# Patient Record
Sex: MALE | Race: WHITE | URBAN - METROPOLITAN AREA
[De-identification: names, ages, dates, MRNs, and addresses within clinical notes are randomized per-mention and may not be internally consistent; named-entity substitution may affect disease eponyms.]

---

## 2018-06-20 ENCOUNTER — INPATIENT (INPATIENT)
Facility: HOSPITAL | Age: 78
LOS: 1 days | Discharge: NURSING, REHAB OR LONG-TERM CARE | DRG: 640 | End: 2018-06-22
Attending: STUDENT IN AN ORGANIZED HEALTH CARE EDUCATION/TRAINING PROGRAM | Admitting: STUDENT IN AN ORGANIZED HEALTH CARE EDUCATION/TRAINING PROGRAM
Payer: MEDICARE

## 2018-06-20 VITALS
TEMPERATURE: 98 F | OXYGEN SATURATION: 95 % | RESPIRATION RATE: 18 BRPM | DIASTOLIC BLOOD PRESSURE: 79 MMHG | WEIGHT: 158.07 LBS | HEART RATE: 88 BPM | SYSTOLIC BLOOD PRESSURE: 148 MMHG

## 2018-06-20 DIAGNOSIS — R41.0 DISORIENTATION, UNSPECIFIED: ICD-10-CM

## 2018-06-20 LAB
ALBUMIN SERPL ELPH-MCNC: 3.9 G/DL — SIGNIFICANT CHANGE UP (ref 3.3–5)
ALP SERPL-CCNC: 56 U/L — SIGNIFICANT CHANGE UP (ref 40–120)
ALT FLD-CCNC: 25 U/L — SIGNIFICANT CHANGE UP (ref 10–45)
ANION GAP SERPL CALC-SCNC: 21 MMOL/L — HIGH (ref 5–17)
ANION GAP SERPL CALC-SCNC: 29 MMOL/L — HIGH (ref 5–17)
APAP SERPL-MCNC: <15 UG/ML — SIGNIFICANT CHANGE UP (ref 10–30)
APPEARANCE UR: ABNORMAL
AST SERPL-CCNC: 19 U/L — SIGNIFICANT CHANGE UP (ref 10–40)
B-OH-BUTYR SERPL-SCNC: 2.4 MMOL/L — HIGH
B-OH-BUTYR SERPL-SCNC: 3 MMOL/L — HIGH
BASOPHILS NFR BLD AUTO: 0.5 % — SIGNIFICANT CHANGE UP (ref 0–2)
BILIRUB SERPL-MCNC: 0.4 MG/DL — SIGNIFICANT CHANGE UP (ref 0.2–1.2)
BILIRUB UR-MCNC: ABNORMAL
BUN SERPL-MCNC: 39 MG/DL — HIGH (ref 7–23)
BUN SERPL-MCNC: 39 MG/DL — HIGH (ref 7–23)
CALCIUM SERPL-MCNC: 8.4 MG/DL — SIGNIFICANT CHANGE UP (ref 8.4–10.5)
CALCIUM SERPL-MCNC: 9.9 MG/DL — SIGNIFICANT CHANGE UP (ref 8.4–10.5)
CHLORIDE SERPL-SCNC: 90 MMOL/L — LOW (ref 96–108)
CHLORIDE SERPL-SCNC: 98 MMOL/L — SIGNIFICANT CHANGE UP (ref 96–108)
CO2 SERPL-SCNC: 13 MMOL/L — LOW (ref 22–31)
CO2 SERPL-SCNC: 15 MMOL/L — LOW (ref 22–31)
COLOR SPEC: YELLOW — SIGNIFICANT CHANGE UP
CREAT SERPL-MCNC: 2.34 MG/DL — HIGH (ref 0.5–1.3)
CREAT SERPL-MCNC: 2.65 MG/DL — HIGH (ref 0.5–1.3)
DIFF PNL FLD: ABNORMAL
EOSINOPHIL NFR BLD AUTO: 2.2 % — SIGNIFICANT CHANGE UP (ref 0–6)
ETHANOL SERPL-MCNC: <10 MG/DL — SIGNIFICANT CHANGE UP (ref 0–10)
EXTRA BLUE TOP TUBE: SIGNIFICANT CHANGE UP
EXTRA SST TUBE: SIGNIFICANT CHANGE UP
GAS PNL BLDV: SIGNIFICANT CHANGE UP
GLUCOSE SERPL-MCNC: 149 MG/DL — HIGH (ref 70–99)
GLUCOSE SERPL-MCNC: 149 MG/DL — HIGH (ref 70–99)
GLUCOSE UR QL: NEGATIVE — SIGNIFICANT CHANGE UP
HCT VFR BLD CALC: 36 % — LOW (ref 39–50)
HGB BLD-MCNC: 12.1 G/DL — LOW (ref 13–17)
KETONES UR-MCNC: ABNORMAL MG/DL
LACTATE SERPL-SCNC: 1.2 MMOL/L — SIGNIFICANT CHANGE UP (ref 0.5–2)
LACTATE SERPL-SCNC: 2.3 MMOL/L — HIGH (ref 0.5–2)
LEUKOCYTE ESTERASE UR-ACNC: NEGATIVE — SIGNIFICANT CHANGE UP
LYMPHOCYTES # BLD AUTO: 14.8 % — SIGNIFICANT CHANGE UP (ref 13–44)
MCHC RBC-ENTMCNC: 31.4 PG — SIGNIFICANT CHANGE UP (ref 27–34)
MCHC RBC-ENTMCNC: 33.6 G/DL — SIGNIFICANT CHANGE UP (ref 32–36)
MCV RBC AUTO: 93.5 FL — SIGNIFICANT CHANGE UP (ref 80–100)
MONOCYTES NFR BLD AUTO: 8.2 % — SIGNIFICANT CHANGE UP (ref 2–14)
NEUTROPHILS NFR BLD AUTO: 74.3 % — SIGNIFICANT CHANGE UP (ref 43–77)
NITRITE UR-MCNC: NEGATIVE — SIGNIFICANT CHANGE UP
PCP SPEC-MCNC: SIGNIFICANT CHANGE UP
PH UR: 5 — SIGNIFICANT CHANGE UP (ref 5–8)
PLATELET # BLD AUTO: 435 K/UL — HIGH (ref 150–400)
POTASSIUM SERPL-MCNC: 4.2 MMOL/L — SIGNIFICANT CHANGE UP (ref 3.5–5.3)
POTASSIUM SERPL-MCNC: 4.3 MMOL/L — SIGNIFICANT CHANGE UP (ref 3.5–5.3)
POTASSIUM SERPL-SCNC: 4.2 MMOL/L — SIGNIFICANT CHANGE UP (ref 3.5–5.3)
POTASSIUM SERPL-SCNC: 4.3 MMOL/L — SIGNIFICANT CHANGE UP (ref 3.5–5.3)
PROT SERPL-MCNC: 8.2 G/DL — SIGNIFICANT CHANGE UP (ref 6–8.3)
PROT UR-MCNC: 30 MG/DL
RAPID RVP RESULT: SIGNIFICANT CHANGE UP
RBC # BLD: 3.85 M/UL — LOW (ref 4.2–5.8)
RBC # FLD: 13.3 % — SIGNIFICANT CHANGE UP (ref 10.3–16.9)
SALICYLATES SERPL-MCNC: <0.3 MG/DL — LOW (ref 2.8–20)
SODIUM SERPL-SCNC: 132 MMOL/L — LOW (ref 135–145)
SODIUM SERPL-SCNC: 134 MMOL/L — LOW (ref 135–145)
SP GR SPEC: >=1.03 — SIGNIFICANT CHANGE UP (ref 1–1.03)
TSH SERPL-MCNC: 3.25 UIU/ML — SIGNIFICANT CHANGE UP (ref 0.35–4.94)
UROBILINOGEN FLD QL: 1 E.U./DL — SIGNIFICANT CHANGE UP
WBC # BLD: 14.3 K/UL — HIGH (ref 3.8–10.5)
WBC # FLD AUTO: 14.3 K/UL — HIGH (ref 3.8–10.5)

## 2018-06-20 PROCEDURE — 93010 ELECTROCARDIOGRAM REPORT: CPT

## 2018-06-20 PROCEDURE — 70450 CT HEAD/BRAIN W/O DYE: CPT | Mod: 26

## 2018-06-20 PROCEDURE — 99223 1ST HOSP IP/OBS HIGH 75: CPT | Mod: GC

## 2018-06-20 PROCEDURE — 71046 X-RAY EXAM CHEST 2 VIEWS: CPT | Mod: 26

## 2018-06-20 PROCEDURE — 99285 EMERGENCY DEPT VISIT HI MDM: CPT | Mod: 25

## 2018-06-20 RX ORDER — SODIUM CHLORIDE 9 MG/ML
1000 INJECTION INTRAMUSCULAR; INTRAVENOUS; SUBCUTANEOUS ONCE
Qty: 0 | Refills: 0 | Status: COMPLETED | OUTPATIENT
Start: 2018-06-20 | End: 2018-06-20

## 2018-06-20 RX ORDER — SODIUM CHLORIDE 9 MG/ML
1000 INJECTION, SOLUTION INTRAVENOUS
Qty: 0 | Refills: 0 | Status: DISCONTINUED | OUTPATIENT
Start: 2018-06-20 | End: 2018-06-22

## 2018-06-20 RX ADMIN — SODIUM CHLORIDE 1000 MILLILITER(S): 9 INJECTION INTRAMUSCULAR; INTRAVENOUS; SUBCUTANEOUS at 22:12

## 2018-06-20 RX ADMIN — SODIUM CHLORIDE 1000 MILLILITER(S): 9 INJECTION INTRAMUSCULAR; INTRAVENOUS; SUBCUTANEOUS at 20:59

## 2018-06-20 RX ADMIN — SODIUM CHLORIDE 100 MILLILITER(S): 9 INJECTION, SOLUTION INTRAVENOUS at 22:15

## 2018-06-20 NOTE — ED ADULT TRIAGE NOTE - CHIEF COMPLAINT QUOTE
" I have been off my psych meds for two weeks and MD Galvez wants me to come here to get clearance".

## 2018-06-20 NOTE — ED BEHAVIORAL HEALTH ASSESSMENT NOTE - OTHER
older than stated age not assessed edentulous "fine" though illogical when speaks about discontinuing all psychiatric medications

## 2018-06-20 NOTE — ED BEHAVIORAL HEALTH ASSESSMENT NOTE - SUMMARY
77 yo male with self-reported bipolar with apparently no psychiatric hospitalizations in past and multiple medical problems brought in by self accompanied by friend Lanette at behest of PCP who saw patient earlier. Patient has discontinued all medications, including thyroid, blood pressure, and cannot articulate why. It is unclear if patient is grappling with an organic process, such as new-onset dementia vs. delirium perhaps resolving (elevated WBC ct and patient reported bronchitis) that is making patient amotivated and at times behaviorally disinhibited regarding sex,and not able to complete ADLs. Writer is more inclined to think process is more organic in nature. 77 yo male with self-reported bipolar with apparently no psychiatric hospitalizations in past and multiple medical problems brought in by self accompanied by friend Lanette at behest of PCP who saw patient earlier today. Patient has discontinued all medications, including thyroid, blood pressure, and cannot articulate why. It is unclear if patient is grappling with an organic process, such as new-onset dementia vs. delirium perhaps resolving (elevated WBC ct and patient reported bronchitis) that is making patient amotivated and at times behaviorally disinhibited regarding sex and not able to complete ADLs. Writer is more inclined to think process is more organic in nature. Patient does not appear to be an acute risk to himself or others. After discussion with ED physician Dr. Montelongo, patient was going to be admitted to medicine. At this time, patient does not require CO. Consult Liaison psychiatry will follow up in AM. Disposition planning might include APS referral in the ensuing days.

## 2018-06-20 NOTE — ED ADULT NURSE NOTE - CHPI ED SYMPTOMS NEG
no disorientation/no homicidal/no agitation/no weakness/no paranoia/no confusion/no change in level of consciousness/no suicidal/no hallucinations

## 2018-06-20 NOTE — ED PROVIDER NOTE - PHYSICAL EXAMINATION
Constitutional: Well appearing, well nourished, awake, alert, oriented to person, place, time/situation and in no apparent distress.  ENMT: Airway patent. Dry MM. edentulous.  Eyes: Clear bilaterally  Cardiac: Normal rate, regular rhythm.  Heart sounds S1, S2.  No murmurs, rubs or gallops.  Respiratory: Breaths sounds equal and clear b/l. No increased WOB, tachypnea, hypoxia, or accessory mm use. Pt speaks in full sentences.   Gastrointestinal: Abd soft, NT, ND, NABS. No guarding, rebound, or rigidity. No pulsatile abdominal masses. No organomegaly appreciated. No CVAT   Musculoskeletal: Range of motion is not limited  Neuro: Alert and oriented, grossly non focal  Skin: Skin normal color for race, warm, dry and intact. No evidence of rash.  Psych: Alert and oriented to person, place, time/situation. normal mood and affect. no apparent risk to self or others.

## 2018-06-20 NOTE — ED PROVIDER NOTE - PROGRESS NOTE DETAILS
Psych consulted and will see the pt Psych at bedside evaluating pt D/w PCP Dr Forbes - pt w/ long-standing hx depression. PT came to office stating he is not taking meds nor eating for past 2 weeks. He feels 2/2 severe depression. Pt would not state he would take his meds. He sent pt in for psych eval. Pt w/ AG acidosis, likely 2/2 dehydration and not eating (starvation ketosis). Not diabetic, . Continue IVF, D51/2NS, repeat labs, admit to medicine. Pt seen by psychiatry - they will follow. No psych meds at this time. No 1:1 necessary at this time a/p psychiatry Dr [place] closing, neg lactate, improving labs. Will admit to medicine for continued treatment

## 2018-06-20 NOTE — ED PROVIDER NOTE - CARE PLAN
Principal Discharge DX:	Dehydration  Secondary Diagnosis:	Starvation ketoacidosis  Secondary Diagnosis:	Depression, unspecified depression type

## 2018-06-20 NOTE — ED PROVIDER NOTE - MEDICAL DECISION MAKING DETAILS
Pt p/w depression w/o SI / HI / AH / VH. Pt not taking meds or eating x 2 weeks. Will r/o medical causes / psych medical clearance, dehydration, electrolyte abnormalities. 1:1. Psych consult. Dispo pending w/u and psych recommendations.

## 2018-06-20 NOTE — ED BEHAVIORAL HEALTH ASSESSMENT NOTE - RISK ASSESSMENT
Patient cannot safely care for himself at present Patient cannot safely care for himself at present. Thus, he requires admission to rule out organicity given patient's multiple derangements. Psychiatry will continue to follow.

## 2018-06-20 NOTE — ED BEHAVIORAL HEALTH ASSESSMENT NOTE - DESCRIPTION
Patient is pleasant and behaving appropriately. See above. Patient has stopped all medications. lives alone in apt in [place]

## 2018-06-20 NOTE — ED ADULT NURSE NOTE - OBJECTIVE STATEMENT
Pt w hx of chronic depression referred to ER by his PsychMD Leonor. Pt states he has not been taking his psych meds for two weeks, has not been eating properly and was feeling "washed out". He denies SI/HI, drug or alcohol use. Pt placed on cont observation for safety.

## 2018-06-20 NOTE — ED BEHAVIORAL HEALTH ASSESSMENT NOTE - HPI (INCLUDE ILLNESS QUALITY, SEVERITY, DURATION, TIMING, CONTEXT, MODIFYING FACTORS, ASSOCIATED SIGNS AND SYMPTOMS)
77 yo single, domiciled alone, retired male, non-caregiver, no illicit substances with self-reported hx of bipolar (denies ever being psychiatrically hospitalized, patient's PCP denies knowing in detail patient's psychiatric history) has not seen outpatient psychiatrist Dr. Lissette Arroyo [phone] in over 1 month and multiple medical problems, including hypothyroidism, HTN, and COPD with recent self-reported bronchitis (elevated WBC ct 14.3) BIBS accompanied by friend Lanette [phone] at behest of patient's PCP. PCP Dr. Rodríguez was concerned because patient had stopped his medications (Patient relayed that he self d/c'd all medications.) two weeks ago and was not eating well. Additionally, Patient told writer that he discontinued all medications, including psychiatric, two weeks ago without telling anyone. He conveyed that he stopped them for a "washout," Patient could not elaborate what that meant. He told writer that he had his divalproex sodium discontinued approximately 6 months ago and that concomitantly he would be tapered off fluoxetine and started on oxcarbazepine. Patient did not know divalproex sodium and fluoxetine doses, though said they were stopped more than 2 weeks ago, but recalled that oxcarbazepine dose was 300mg BID. Patient was AAO*3 (though somewhat confused when conveying psychiatric and medical histories). He described his mood as "fine," but admitted that he has not been taking good care of himself. Friend Lanette brought him to PCP today. Of note, patient mentioned feeling more sexual lately. Patient denies ever having manic episode, ever being suicidal, homicidal. He denies anxiety, auditory and visual hallucinations. No delusions have been verbalized. Patient thinks that he will be admitted to the hospital.    As stated above, patient has WBC ct of 14.3. Additionally, other metabolic derangements include Na 132, Cr. 2.65, Anion gap 29.

## 2018-06-20 NOTE — ED CLERICAL - NS ED CLERK NOTE PRE-ARRIVAL INFORMATION; ADDITIONAL PRE-ARRIVAL INFORMATION
79 y/o Patrick Allen stopped taking all meds, HTN, severe  depression , called in by Dr Forbes [phone] see intake paper

## 2018-06-20 NOTE — ED PROVIDER NOTE - OBJECTIVE STATEMENT
Pt w/ PMHx COPD, HTN, HLD, thyroid disease, depression sent in by PCP for evaluation of depression. Pt reports 5-6 years depression. Pt was previously on Prozac and Depakote, however his psychiatrist has been decreasing the Prozac, and starting the pt on Trileptal as of 1 month ago. Pt reports he has been having inc sexual activity, and also reports recent "bout of bronchitis." Pt states since then he has been "washed out." Pt stopped taking his meds and stopped eating for the past 2 weeks. Pt admits to depression, but denies SI, HI, AH / VH. Pt reports prior SI. Pt lives alone. Pt denies drug / alcohol abuse. No current physical complaints. + mild cough, dry. No f/c. No CP, SOB. Pt w/ PMHx COPD, HTN, HLD, thyroid disease, pernicious anemia, depression sent in by PCP for evaluation of depression. Pt reports 5-6 years depression. Pt was previously on Prozac and Depakote, however his psychiatrist has been decreasing the Prozac, and starting the pt on Trileptal as of 1 month ago. Pt reports he has been having inc sexual activity, and also reports recent "bout of bronchitis." Pt states since then he has been "washed out." Pt stopped taking his meds and stopped eating for the past 2 weeks. Pt admits to depression, but denies SI, HI, AH / VH. Pt reports prior SI. Pt lives alone. Pt denies drug / alcohol abuse. No current physical complaints. + mild cough, dry. No f/c. No CP, SOB.

## 2018-06-20 NOTE — ED BEHAVIORAL HEALTH ASSESSMENT NOTE - DETAILS
"I feel weak." cough "I stopped thyroid medications." Apprised to follow up Dr. Montelongo made aware

## 2018-06-21 DIAGNOSIS — R41.89 OTHER SYMPTOMS AND SIGNS INVOLVING COGNITIVE FUNCTIONS AND AWARENESS: ICD-10-CM

## 2018-06-21 DIAGNOSIS — Z29.9 ENCOUNTER FOR PROPHYLACTIC MEASURES, UNSPECIFIED: ICD-10-CM

## 2018-06-21 DIAGNOSIS — F31.9 BIPOLAR DISORDER, UNSPECIFIED: ICD-10-CM

## 2018-06-21 DIAGNOSIS — Z90.89 ACQUIRED ABSENCE OF OTHER ORGANS: Chronic | ICD-10-CM

## 2018-06-21 DIAGNOSIS — I10 ESSENTIAL (PRIMARY) HYPERTENSION: ICD-10-CM

## 2018-06-21 DIAGNOSIS — E46 UNSPECIFIED PROTEIN-CALORIE MALNUTRITION: ICD-10-CM

## 2018-06-21 DIAGNOSIS — D64.9 ANEMIA, UNSPECIFIED: ICD-10-CM

## 2018-06-21 DIAGNOSIS — D47.3 ESSENTIAL (HEMORRHAGIC) THROMBOCYTHEMIA: ICD-10-CM

## 2018-06-21 DIAGNOSIS — R73.03 PREDIABETES: ICD-10-CM

## 2018-06-21 DIAGNOSIS — D72.829 ELEVATED WHITE BLOOD CELL COUNT, UNSPECIFIED: ICD-10-CM

## 2018-06-21 DIAGNOSIS — E87.2 ACIDOSIS: ICD-10-CM

## 2018-06-21 DIAGNOSIS — N17.9 ACUTE KIDNEY FAILURE, UNSPECIFIED: ICD-10-CM

## 2018-06-21 DIAGNOSIS — Z98.49 CATARACT EXTRACTION STATUS, UNSPECIFIED EYE: Chronic | ICD-10-CM

## 2018-06-21 DIAGNOSIS — F32.9 MAJOR DEPRESSIVE DISORDER, SINGLE EPISODE, UNSPECIFIED: ICD-10-CM

## 2018-06-21 LAB
ALBUMIN SERPL ELPH-MCNC: 3.1 G/DL — LOW (ref 3.3–5)
ALP SERPL-CCNC: 45 U/L — SIGNIFICANT CHANGE UP (ref 40–120)
ALT FLD-CCNC: 18 U/L — SIGNIFICANT CHANGE UP (ref 10–45)
ANION GAP SERPL CALC-SCNC: 17 MMOL/L — SIGNIFICANT CHANGE UP (ref 5–17)
AST SERPL-CCNC: 15 U/L — SIGNIFICANT CHANGE UP (ref 10–40)
BILIRUB SERPL-MCNC: 0.3 MG/DL — SIGNIFICANT CHANGE UP (ref 0.2–1.2)
BUN SERPL-MCNC: 35 MG/DL — HIGH (ref 7–23)
CALCIUM SERPL-MCNC: 8.9 MG/DL — SIGNIFICANT CHANGE UP (ref 8.4–10.5)
CHLORIDE SERPL-SCNC: 101 MMOL/L — SIGNIFICANT CHANGE UP (ref 96–108)
CO2 SERPL-SCNC: 18 MMOL/L — LOW (ref 22–31)
CREAT ?TM UR-MCNC: 69 MG/DL — SIGNIFICANT CHANGE UP
CREAT SERPL-MCNC: 2.02 MG/DL — HIGH (ref 0.5–1.3)
FERRITIN SERPL-MCNC: 500 NG/ML — HIGH (ref 30–400)
GLUCOSE SERPL-MCNC: 151 MG/DL — HIGH (ref 70–99)
HAPTOGLOB SERPL-MCNC: 473 MG/DL — HIGH (ref 34–200)
HBA1C BLD-MCNC: 6.3 % — HIGH (ref 4–5.6)
HCT VFR BLD CALC: 30.4 % — LOW (ref 39–50)
HGB BLD-MCNC: 10.1 G/DL — LOW (ref 13–17)
IRON SATN MFR SERPL: 37 % — SIGNIFICANT CHANGE UP (ref 16–55)
IRON SATN MFR SERPL: 45 UG/DL — SIGNIFICANT CHANGE UP (ref 45–165)
LDH SERPL L TO P-CCNC: 207 U/L — SIGNIFICANT CHANGE UP (ref 50–242)
MAGNESIUM SERPL-MCNC: 2.1 MG/DL — SIGNIFICANT CHANGE UP (ref 1.6–2.6)
MAGNESIUM SERPL-MCNC: 2.2 MG/DL — SIGNIFICANT CHANGE UP (ref 1.6–2.6)
MCHC RBC-ENTMCNC: 31.3 PG — SIGNIFICANT CHANGE UP (ref 27–34)
MCHC RBC-ENTMCNC: 33.2 G/DL — SIGNIFICANT CHANGE UP (ref 32–36)
MCV RBC AUTO: 94.1 FL — SIGNIFICANT CHANGE UP (ref 80–100)
OSMOLALITY SERPL: 292 MOSM/KG — SIGNIFICANT CHANGE UP (ref 280–301)
OSMOLALITY UR: 282 MOSMOL/KG — SIGNIFICANT CHANGE UP (ref 100–650)
PHOSPHATE SERPL-MCNC: 4.1 MG/DL — SIGNIFICANT CHANGE UP (ref 2.5–4.5)
PHOSPHATE SERPL-MCNC: 4.4 MG/DL — SIGNIFICANT CHANGE UP (ref 2.5–4.5)
PLATELET # BLD AUTO: 348 K/UL — SIGNIFICANT CHANGE UP (ref 150–400)
POTASSIUM SERPL-MCNC: 4.7 MMOL/L — SIGNIFICANT CHANGE UP (ref 3.5–5.3)
POTASSIUM SERPL-SCNC: 4.7 MMOL/L — SIGNIFICANT CHANGE UP (ref 3.5–5.3)
PROT SERPL-MCNC: 6.3 G/DL — SIGNIFICANT CHANGE UP (ref 6–8.3)
RBC # BLD: 3.23 M/UL — LOW (ref 4.2–5.8)
RBC # FLD: 13.3 % — SIGNIFICANT CHANGE UP (ref 10.3–16.9)
SODIUM SERPL-SCNC: 136 MMOL/L — SIGNIFICANT CHANGE UP (ref 135–145)
SODIUM UR-SCNC: 41 MMOL/L — SIGNIFICANT CHANGE UP
TIBC SERPL-MCNC: 123 UG/DL — LOW (ref 220–430)
UIBC SERPL-MCNC: 78 UG/DL — LOW (ref 110–370)
WBC # BLD: 10.1 K/UL — SIGNIFICANT CHANGE UP (ref 3.8–10.5)
WBC # FLD AUTO: 10.1 K/UL — SIGNIFICANT CHANGE UP (ref 3.8–10.5)

## 2018-06-21 PROCEDURE — 99233 SBSQ HOSP IP/OBS HIGH 50: CPT

## 2018-06-21 PROCEDURE — 76775 US EXAM ABDO BACK WALL LIM: CPT | Mod: 26

## 2018-06-21 RX ORDER — PROPRANOLOL HCL 160 MG
20 CAPSULE, EXTENDED RELEASE 24HR ORAL
Qty: 0 | Refills: 0 | Status: DISCONTINUED | OUTPATIENT
Start: 2018-06-21 | End: 2018-06-22

## 2018-06-21 RX ORDER — AMLODIPINE BESYLATE 2.5 MG/1
5 TABLET ORAL DAILY
Qty: 0 | Refills: 0 | Status: DISCONTINUED | OUTPATIENT
Start: 2018-06-21 | End: 2018-06-22

## 2018-06-21 RX ORDER — LOSARTAN POTASSIUM 100 MG/1
100 TABLET, FILM COATED ORAL DAILY
Qty: 0 | Refills: 0 | Status: DISCONTINUED | OUTPATIENT
Start: 2018-06-21 | End: 2018-06-21

## 2018-06-21 RX ORDER — HEPARIN SODIUM 5000 [USP'U]/ML
5000 INJECTION INTRAVENOUS; SUBCUTANEOUS EVERY 8 HOURS
Qty: 0 | Refills: 0 | Status: DISCONTINUED | OUTPATIENT
Start: 2018-06-21 | End: 2018-06-22

## 2018-06-21 RX ORDER — LEVOTHYROXINE SODIUM 125 MCG
50 TABLET ORAL DAILY
Qty: 0 | Refills: 0 | Status: DISCONTINUED | OUTPATIENT
Start: 2018-06-21 | End: 2018-06-22

## 2018-06-21 RX ORDER — ATORVASTATIN CALCIUM 80 MG/1
40 TABLET, FILM COATED ORAL AT BEDTIME
Qty: 0 | Refills: 0 | Status: DISCONTINUED | OUTPATIENT
Start: 2018-06-21 | End: 2018-06-22

## 2018-06-21 RX ADMIN — Medication 20 MILLIGRAM(S): at 19:01

## 2018-06-21 RX ADMIN — HEPARIN SODIUM 5000 UNIT(S): 5000 INJECTION INTRAVENOUS; SUBCUTANEOUS at 06:12

## 2018-06-21 RX ADMIN — HEPARIN SODIUM 5000 UNIT(S): 5000 INJECTION INTRAVENOUS; SUBCUTANEOUS at 14:41

## 2018-06-21 RX ADMIN — Medication 20 MILLIGRAM(S): at 06:12

## 2018-06-21 RX ADMIN — SODIUM CHLORIDE 100 MILLILITER(S): 9 INJECTION, SOLUTION INTRAVENOUS at 06:52

## 2018-06-21 RX ADMIN — Medication 50 MICROGRAM(S): at 06:12

## 2018-06-21 RX ADMIN — ATORVASTATIN CALCIUM 40 MILLIGRAM(S): 80 TABLET, FILM COATED ORAL at 21:35

## 2018-06-21 RX ADMIN — AMLODIPINE BESYLATE 5 MILLIGRAM(S): 2.5 TABLET ORAL at 06:12

## 2018-06-21 RX ADMIN — HEPARIN SODIUM 5000 UNIT(S): 5000 INJECTION INTRAVENOUS; SUBCUTANEOUS at 21:36

## 2018-06-21 NOTE — PROGRESS NOTE BEHAVIORAL HEALTH - SUMMARY
77 yo male with self-reported bipolar with apparently no psychiatric hospitalizations in past and multiple medical problems brought in by self accompanied by friend Lanette at the recommendation of his PCP after the patient stopped all his medications and stopped eating for the last 2 weeks. Patient reports ongoing depression symptoms and some hypomanic sx (hypersexuality) which contributed to his decision to stop his treatment. 79 yo male with self-reported bipolar with apparently no psychiatric hospitalizations in past and multiple medical problems brought in by self accompanied by friend Lanette at the recommendation of his PCP after the patient stopped all his medications and stopped eating for the last 2 weeks. Patient reports ongoing depression symptoms and some hypomanic sx (hypersexuality) which contributed to his decision to stop his treatment. Patient also reports a chronic decline in his cognitive abilities which would be suggestive on an underlying dementia process.

## 2018-06-21 NOTE — PROGRESS NOTE BEHAVIORAL HEALTH - PROBLEM SELECTOR PLAN 1
-continue to hold patient's outpatient psychiatric medications (trileptal)  -patient is calm and cooperative and does not require a 1:1  -obtain collateral information re patient's past treatments and recent presentation  -will continue to follow the patient -continue to hold patient's outpatient psychiatric medications (trileptal); consider switching to another agent as the patient has poor renal function at baseline  -patient is calm and cooperative and does not require a 1:1  -obtain collateral information re patient's past treatments and recent presentation  -will continue to follow the patient

## 2018-06-21 NOTE — DISCHARGE NOTE ADULT - CARE PLAN
Principal Discharge DX:	Dehydration  Assessment and plan of treatment:	You were admitted to the hospital for dehydration and malnutrition. Your blood work was reflective of your poor nutrition and fluid intake. You were given intravenous fluids to help with your dehydration. You began to tolerate solid foods. Please continue to get psychiatric care with adjustment of your medications to help ensure that you are in better mental and physical health.  Secondary Diagnosis:	REINIER (acute kidney injury)  Assessment and plan of treatment:	You had an acute kidney injury due to inadequate fluid intake, with improvement after the administration of intravenous fluids. We obtained collateral with your primary care doctor and it appears that you have chronic kidney disease with an elevated baseline creatinine around 1.7.  Secondary Diagnosis:	Depression  Assessment and plan of treatment:	You have a diagnosis of depression, for which you had been taking antidepressants up until 2 weeks ago. You will be going to the psychiatric unit to further manage your depression by adjusting the doses of your antidepressants and monitoring your symptoms as you restart your medications. Principal Discharge DX:	Dehydration  Goal:	Continue to eat and drink fluids  Assessment and plan of treatment:	You were admitted to the hospital for dehydration and malnutrition. Your blood work was reflective of your poor nutrition and fluid intake. You were given intravenous fluids to help with your dehydration. You began to tolerate solid foods. Please continue to get psychiatric care with adjustment of your medications to help ensure that you are in better mental and physical health.  Secondary Diagnosis:	REINIER (acute kidney injury)  Assessment and plan of treatment:	You had an acute kidney injury due to inadequate fluid intake, with improvement after the administration of intravenous fluids. We obtained collateral with your primary care doctor and it appears that you have chronic kidney disease with an elevated baseline creatinine around 1.7. You are being discharged with a Cr of 1.3.  Secondary Diagnosis:	Depression  Assessment and plan of treatment:	You have a diagnosis of depression, for which you had been taking antidepressants up until 2 weeks ago. You will be going to the psychiatric unit to further manage your depression by adjusting the doses of your antidepressants and monitoring your symptoms as you restart your medications. Principal Discharge DX:	Dehydration  Goal:	Continue to eat and drink fluids  Assessment and plan of treatment:	You were admitted to the hospital for dehydration and malnutrition. Your blood work was reflective of your poor nutrition and fluid intake. You were given intravenous fluids to help with your dehydration. You began to tolerate solid foods. Please continue to get psychiatric care with adjustment of your medications to help ensure that you are in better mental and physical health.  Secondary Diagnosis:	REINIER (acute kidney injury)  Assessment and plan of treatment:	You had an acute kidney injury due to inadequate fluid intake, with improvement after the administration of intravenous fluids. We obtained collateral with your primary care doctor and it appears that you have chronic kidney disease with an elevated baseline creatinine around 1.7. You are being discharged with a Cr of 1.3.  Secondary Diagnosis:	Depression  Assessment and plan of treatment:	You have a diagnosis of depression, for which you had been taking antidepressants up until 2 weeks ago. You will be going to the psychiatric unit to further manage your depression by adjusting the doses of your antidepressants and monitoring your symptoms as you restart your medications.  Secondary Diagnosis:	Moderate malnutrition  Assessment and plan of treatment:	You had malnutrition, due to not consuming anything for 2 weeks besides water leading to weight loss and electrolyte derangements/changes. Please continue to eat your meals and drink fluids. You will be going to the psychiatric unit where they will help treat your depression and thereby also assist with your malnutrition.

## 2018-06-21 NOTE — H&P ADULT - NSHPLABSRESULTS_GEN_ALL_CORE
06-20    134<L>  |  98  |  39<H>  ----------------------------<  149<H>  4.2   |  15<L>  |  2.34<H>    Ca    8.4      20 Jun 2018 22:57    TPro  8.2  /  Alb  3.9  /  TBili  0.4  /  DBili  x   /  AST  19  /  ALT  25  /  AlkPhos  56  06-20                            12.1   14.3  )-----------( 435      ( 20 Jun 2018 18:49 )             36.0             LIVER FUNCTIONS - ( 20 Jun 2018 18:49 )  Alb: 3.9 g/dL / Pro: 8.2 g/dL / ALK PHOS: 56 U/L / ALT: 25 U/L / AST: 19 U/L / GGT: x                 Urinalysis Basic - ( 20 Jun 2018 21:46 )    Color: Yellow / Appearance: SL Cloudy / SG: >=1.030 / pH: x  Gluc: x / Ketone: Trace mg/dL  / Bili: Moderate / Urobili: 1.0 E.U./dL   Blood: x / Protein: 30 mg/dL / Nitrite: NEGATIVE   Leuk Esterase: NEGATIVE / RBC: < 5 /HPF / WBC < 5 /HPF   Sq Epi: x / Non Sq Epi: 0-5 /HPF / Bacteria: Present /HPF

## 2018-06-21 NOTE — H&P ADULT - PROBLEM SELECTOR PLAN 3
Does not have formal history of anemia and given leukocytosis and thrombocytosis consistent with dehydration suspect that it is lower than 12.1. No signs or symptoms of active bleeding.  Obtain iron panel, LDH/Haptolobin and reticulocyte count.  Pt has had screening colonoscopy in past 5 years unsure of exactly when. No recent weight loss.

## 2018-06-21 NOTE — H&P ADULT - PROBLEM SELECTOR PLAN 6
States that he has not eaten much food or taken much water in approx 2 weeks since stopping his medications.

## 2018-06-21 NOTE — PROGRESS NOTE BEHAVIORAL HEALTH - NSBHCHARTREVIEWIMAGING_PSY_A_CORE FT
< from: Xray Chest 2 Views PA/Lat (06.20.18 @ 19:59) >    NTERPRETATION:  Chest 2 views    History: Cough    Scattered increased lung markings suspect represent chronic changes. No   definite focal infiltrate. Normal size heart. No pleural effusion.    Impression:    Scattered increased lung markings suspect represent chronic lung disease.    < end of copied text >

## 2018-06-21 NOTE — H&P ADULT - PROBLEM SELECTOR PLAN 8
He has self D/C'd his antihypertensives  Restart Norvasc 5mg PO daily and hold Losartan in setting of REINIER  EKG shows NSR w/ normal axis and LVH

## 2018-06-21 NOTE — DISCHARGE NOTE ADULT - HOSPITAL COURSE
78M PMH severe depression (6 years), HTN, hypothyroidism, pre-diabetes who presents with depression causing anorexia x 2 weeks after pt stopped eating and discontinued all medications for that time.  He has severe depression and is treated by an outpatient psyhciatrist who has been changing his medications (decreasing Depakote and increasing Trileptal) because he was unhappy with his decreased libido with improvement. Pt stopped medications after developing a cough. Denies SI/HI or new trauma. Admission labs showed starvation ketosis, elevated lactate, and REINIER 2/2 dehydration. Labs improved after administration of IV D51/2NS and pt tolerated PO (leukocytosis resolved, Na normalized, creatinine downtrended, lactate cleared). Pt medically cleared and ready to be [facility] for continued [facility] care.

## 2018-06-21 NOTE — PROGRESS NOTE BEHAVIORAL HEALTH - NSBHFUPINTERVALHXFT_PSY_A_CORE
Patient seen and interviewed at bedside. He reports that he feels better today after receiving IV fluids. He was able to sleep approximately 6hrs during the night. He reports that prior to his admission he decided to stop all his medications and stop eating to "flush" out the trileptal. Patient reports ongoing depressive symptoms for several months and manic/hypomanic symptoms prior to his decision to stop his medications. He denies any SI/HI. He denies AVH/ PI. On cognitive exam he presented AAOx3, with normal attention. He agrees with the plan to be transferred to psychiatry to be stabilized on his medications.   The writer called Dr. Arroyo  (outpatient psychiatrist) to get collateral information and left message to be called back. Patient seen and interviewed at bedside. He reports that he feels better today after receiving IV fluids. He was able to sleep approximately 6hrs during the night. He reports that prior to his admission he decided to stop all his medications and stop eating to "flush" out the trileptal. Patient reports ongoing depressive symptoms for several months and manic/hypomanic symptoms prior to his decision to stop his medications. States that he has done this before but not with all his medications. He denies any SI/HI. He denies AVH/ PI. On cognitive exam he presented AAOx3, with normal attention. He reports that he has been having great difficulties with his memory and managing his finances by himself. He reports that he was on multiple psychotropics in the past including depakote, abilify, fluoxetine. He does not think that any of them were particularly helpful and would like to try some new options. He agrees with the plan to be transferred to psychiatry to be stabilized on psychotropic medications.   The writer called Dr. Arroyo  (outpatient psychiatrist) to get collateral information and left message to be called back.

## 2018-06-21 NOTE — DISCHARGE NOTE ADULT - MEDICATION SUMMARY - MEDICATIONS TO TAKE
I will START or STAY ON the medications listed below when I get home from the hospital:    losartan 100 mg oral tablet  -- 1 tab(s) by mouth once a day  -- Indication: For Hypertension    propranolol 20 mg oral tablet  -- 1 tab(s) by mouth 2 times a day  -- Indication: For Hypertension    atorvastatin 40 mg oral tablet  -- 1 tab(s) by mouth once a day  -- Indication: For Cholesterol    amLODIPine 5 mg oral tablet  -- 1 tab(s) by mouth once a day  -- Indication: For Hypertension    levothyroxine 50 mcg (0.05 mg) oral tablet  -- 1 tab(s) by mouth once a day  -- Indication: For Hypothyroid I will START or STAY ON the medications listed below when I get home from the hospital:    losartan 100 mg oral tablet  -- 1 tab(s) by mouth once a day  -- Indication: For Hypertension    propranolol 10 mg oral tablet  -- 1 tab(s) by mouth every 12 hours  -- Indication: For Hypertension    atorvastatin 40 mg oral tablet  -- 1 tab(s) by mouth once a day  -- Indication: For Cholesterol    amLODIPine 5 mg oral tablet  -- 1 tab(s) by mouth once a day  -- Indication: For Hypertension    levothyroxine 50 mcg (0.05 mg) oral tablet  -- 1 tab(s) by mouth once a day  -- Indication: For Hypothyroid

## 2018-06-21 NOTE — H&P ADULT - ASSESSMENT
74M  PMHx  HTN, HLD, thyroid disease, depression presents with electrolyte disturbances from depression related anorexia.

## 2018-06-21 NOTE — H&P ADULT - PROBLEM SELECTOR PLAN 1
Pt has unknown baseline but has no history of kidney disease so suspect REINIER  Obtain Urine Na, Urine Cr, Urine OSM, and renal US  Still making urine  D5/1/2NS@100mls/hr and recheck     #Hyponatremia  Suspect hypovolumic hyponatremia from anorexia vs. SIADH from medication side effect less likely  Check urine sodium, urine OSM and serum OSM

## 2018-06-21 NOTE — DISCHARGE NOTE ADULT - PLAN OF CARE
You were admitted to the hospital for dehydration and malnutrition. Your blood work was reflective of your poor nutrition and fluid intake. You were given intravenous fluids to help with your dehydration. You began to tolerate solid foods. Please continue to get psychiatric care with adjustment of your medications to help ensure that you are in better mental and physical health. You had an acute kidney injury due to inadequate fluid intake, with improvement after the administration of intravenous fluids. We obtained collateral with your primary care doctor and it appears that you have chronic kidney disease with an elevated baseline creatinine around 1.7. You have a diagnosis of depression, for which you had been taking antidepressants up until 2 weeks ago. You will be going to the psychiatric unit to further manage your depression by adjusting the doses of your antidepressants and monitoring your symptoms as you restart your medications. Continue to eat and drink fluids You had an acute kidney injury due to inadequate fluid intake, with improvement after the administration of intravenous fluids. We obtained collateral with your primary care doctor and it appears that you have chronic kidney disease with an elevated baseline creatinine around 1.7. You are being discharged with a Cr of 1.3. You had malnutrition, due to not consuming anything for 2 weeks besides water leading to weight loss and electrolyte derangements/changes. Please continue to eat your meals and drink fluids. You will be going to the psychiatric unit where they will help treat your depression and thereby also assist with your malnutrition.

## 2018-06-21 NOTE — H&P ADULT - NSHPPHYSICALEXAM_GEN_ALL_CORE
ICU Vital Signs Last 24 Hrs  T(C): 36.2 (21 Jun 2018 00:05), Max: 36.8 (20 Jun 2018 17:06)  T(F): 97.2 (21 Jun 2018 00:05), Max: 98.2 (20 Jun 2018 17:06)  HR: 71 (21 Jun 2018 00:05) (68 - 88)  BP: 152/78 (21 Jun 2018 00:05) (119/68 - 168/72)  BP(mean): --  ABP: --  ABP(mean): --  RR: 18 (21 Jun 2018 00:05) (18 - 18)  SpO2: 96% (21 Jun 2018 00:05) (95% - 97%)    General: NAD, calm, AAOx3, answering questions appropriately   HEENT:Tardive dyskinesia of mouth, dry MM no JVD, poor hygiene, no teeth  Cardio: RRR no murmurs  Lung: CTAB no wheezing or rhonchi  Abd: Soft, NTND normal bowel sounds  Ext: WWP no edema or rashes  Psych: NO hallucinations or inappropriate behavior.   Neuro: Tongue midline, UE 5/5 in flexion/extension and LE 5/5 Flexion/extension about hip and knee BL.  Biceps, brachioradialis and patellar reflexes 2+.

## 2018-06-21 NOTE — H&P ADULT - PROBLEM SELECTOR PLAN 5
Due to his depression he has not had eaten much in 2 weeks and now has increased BHB from starvation ketosis.   -Continue to hydrate and replete electrolytes.   D5/1/2NS@100mls/hr Due to his depression he has not had eaten much in 2 weeks and now has increased BHB from starvation ketosis, lactic acidosis and renal failure.   -Continue to hydrate and replete electrolytes.   D5/1/2NS@100mls/hr

## 2018-06-21 NOTE — DISCHARGE NOTE ADULT - MEDICATION SUMMARY - MEDICATIONS TO STOP TAKING
I will STOP taking the medications listed below when I get home from the hospital:    OXcarbazepine 300 mg oral tablet  -- 1 tab(s) by mouth 2 times a day    FLUoxetine 20 mg oral capsule  -- 1 cap(s) by mouth once a day    Depakote 250 mg oral delayed release tablet

## 2018-06-21 NOTE — H&P ADULT - HISTORY OF PRESENT ILLNESS
78M pmhx of severe depression (6 years), HTN, hypothyroidism, pre-diabetes who presents with depression causing anorexia x 2 weeks after self DC all of his medications.  He has severe depression and is treated by an outpatient psyhciatrist who has been changing his medications (decreasing Depakote and increasing Trileptal) because he was unhappy with his decreased libido.  He says that has improved a significant amount but that he feels his behavior is not abnormal (hypersexual) after stopping his medications.  He stopped all of his meds 2 weeks ago when he had a cough (attributed it to bronchitis) and felt that he should stop all of his medications at that time.  He denies any new trauma or significant event at that time that preceded this.  He went to his PCP Dr. Galvez who said that he needs to come to the ED for evaluation of depression.  He denies an SI/HI.    In ED: T97.5 HR 68 /72 RR 18 O2 95%  He was given NS and started on D5/1/2 NS.

## 2018-06-21 NOTE — H&P ADULT - PROBLEM SELECTOR PLAN 2
Pt has no signs or symptoms of infection or inflammation and is dehydrated on exam so suspect hemoconcentration.  Hydrate as above and repeat in AM.  UA negative, CXR clear, RVP negative and pt has no symptoms.

## 2018-06-21 NOTE — H&P ADULT - PROBLEM SELECTOR PLAN 7
He denies any SI/HI and has been evaluated by psychiatry in ED and they recommend holding antipsycotics and they will follow for repeat evaluation.  Will obtain collateral from outpatient psychiatrist who he says has been taking him off of Depakote and uptitrating trilepta because of his lack of libido on Depakote and Fluoxetine.  Check TSH    #Hypothyroidism  -Resume Synthroid 50mcg PO daily and check TSH to ensure this is not related to his worsening depression.

## 2018-06-22 ENCOUNTER — INPATIENT (INPATIENT)
Facility: HOSPITAL | Age: 78
LOS: 10 days | Discharge: HOME | DRG: 885 | End: 2018-07-03
Attending: PSYCHIATRY & NEUROLOGY | Admitting: PSYCHIATRY & NEUROLOGY
Payer: MEDICARE

## 2018-06-22 VITALS
DIASTOLIC BLOOD PRESSURE: 48 MMHG | OXYGEN SATURATION: 96 % | RESPIRATION RATE: 16 BRPM | SYSTOLIC BLOOD PRESSURE: 108 MMHG | HEART RATE: 54 BPM | TEMPERATURE: 98 F

## 2018-06-22 DIAGNOSIS — Z98.49 CATARACT EXTRACTION STATUS, UNSPECIFIED EYE: Chronic | ICD-10-CM

## 2018-06-22 DIAGNOSIS — Z90.89 ACQUIRED ABSENCE OF OTHER ORGANS: Chronic | ICD-10-CM

## 2018-06-22 LAB
ANION GAP SERPL CALC-SCNC: 14 MMOL/L — SIGNIFICANT CHANGE UP (ref 5–17)
BUN SERPL-MCNC: 21 MG/DL — SIGNIFICANT CHANGE UP (ref 7–23)
CALCIUM SERPL-MCNC: 8.8 MG/DL — SIGNIFICANT CHANGE UP (ref 8.4–10.5)
CHLORIDE SERPL-SCNC: 106 MMOL/L — SIGNIFICANT CHANGE UP (ref 96–108)
CO2 SERPL-SCNC: 18 MMOL/L — LOW (ref 22–31)
CREAT SERPL-MCNC: 1.3 MG/DL — SIGNIFICANT CHANGE UP (ref 0.5–1.3)
GLUCOSE SERPL-MCNC: 153 MG/DL — HIGH (ref 70–99)
HCT VFR BLD CALC: 29.8 % — LOW (ref 39–50)
HGB BLD-MCNC: 9.8 G/DL — LOW (ref 13–17)
MAGNESIUM SERPL-MCNC: 2.2 MG/DL — SIGNIFICANT CHANGE UP (ref 1.6–2.6)
MCHC RBC-ENTMCNC: 31.5 PG — SIGNIFICANT CHANGE UP (ref 27–34)
MCHC RBC-ENTMCNC: 32.9 G/DL — SIGNIFICANT CHANGE UP (ref 32–36)
MCV RBC AUTO: 95.8 FL — SIGNIFICANT CHANGE UP (ref 80–100)
PHOSPHATE SERPL-MCNC: 3.2 MG/DL — SIGNIFICANT CHANGE UP (ref 2.5–4.5)
PLATELET # BLD AUTO: 315 K/UL — SIGNIFICANT CHANGE UP (ref 150–400)
POTASSIUM SERPL-MCNC: 4.2 MMOL/L — SIGNIFICANT CHANGE UP (ref 3.5–5.3)
POTASSIUM SERPL-SCNC: 4.2 MMOL/L — SIGNIFICANT CHANGE UP (ref 3.5–5.3)
RBC # BLD: 3.11 M/UL — LOW (ref 4.2–5.8)
RBC # FLD: 13.3 % — SIGNIFICANT CHANGE UP (ref 10.3–16.9)
SODIUM SERPL-SCNC: 138 MMOL/L — SIGNIFICANT CHANGE UP (ref 135–145)
WBC # BLD: 9.2 K/UL — SIGNIFICANT CHANGE UP (ref 3.8–10.5)
WBC # FLD AUTO: 9.2 K/UL — SIGNIFICANT CHANGE UP (ref 3.8–10.5)

## 2018-06-22 PROCEDURE — 76775 US EXAM ABDO BACK WALL LIM: CPT

## 2018-06-22 PROCEDURE — 83550 IRON BINDING TEST: CPT

## 2018-06-22 PROCEDURE — 81001 URINALYSIS AUTO W/SCOPE: CPT

## 2018-06-22 PROCEDURE — 83010 ASSAY OF HAPTOGLOBIN QUANT: CPT

## 2018-06-22 PROCEDURE — 84100 ASSAY OF PHOSPHORUS: CPT

## 2018-06-22 PROCEDURE — 83735 ASSAY OF MAGNESIUM: CPT

## 2018-06-22 PROCEDURE — 85027 COMPLETE CBC AUTOMATED: CPT

## 2018-06-22 PROCEDURE — 82330 ASSAY OF CALCIUM: CPT

## 2018-06-22 PROCEDURE — 80048 BASIC METABOLIC PNL TOTAL CA: CPT

## 2018-06-22 PROCEDURE — 83930 ASSAY OF BLOOD OSMOLALITY: CPT

## 2018-06-22 PROCEDURE — 83935 ASSAY OF URINE OSMOLALITY: CPT

## 2018-06-22 PROCEDURE — 99239 HOSP IP/OBS DSCHRG MGMT >30: CPT

## 2018-06-22 PROCEDURE — 82570 ASSAY OF URINE CREATININE: CPT

## 2018-06-22 PROCEDURE — 84132 ASSAY OF SERUM POTASSIUM: CPT

## 2018-06-22 PROCEDURE — 99233 SBSQ HOSP IP/OBS HIGH 50: CPT

## 2018-06-22 PROCEDURE — 70450 CT HEAD/BRAIN W/O DYE: CPT

## 2018-06-22 PROCEDURE — 84443 ASSAY THYROID STIM HORMONE: CPT

## 2018-06-22 PROCEDURE — 80307 DRUG TEST PRSMV CHEM ANLYZR: CPT

## 2018-06-22 PROCEDURE — 83605 ASSAY OF LACTIC ACID: CPT

## 2018-06-22 PROCEDURE — 87581 M.PNEUMON DNA AMP PROBE: CPT

## 2018-06-22 PROCEDURE — 87486 CHLMYD PNEUM DNA AMP PROBE: CPT

## 2018-06-22 PROCEDURE — 93005 ELECTROCARDIOGRAM TRACING: CPT

## 2018-06-22 PROCEDURE — 83615 LACTATE (LD) (LDH) ENZYME: CPT

## 2018-06-22 PROCEDURE — 99285 EMERGENCY DEPT VISIT HI MDM: CPT | Mod: 25

## 2018-06-22 PROCEDURE — 97161 PT EVAL LOW COMPLEX 20 MIN: CPT

## 2018-06-22 PROCEDURE — 82803 BLOOD GASES ANY COMBINATION: CPT

## 2018-06-22 PROCEDURE — 85025 COMPLETE CBC W/AUTO DIFF WBC: CPT

## 2018-06-22 PROCEDURE — 84300 ASSAY OF URINE SODIUM: CPT

## 2018-06-22 PROCEDURE — 80053 COMPREHEN METABOLIC PANEL: CPT

## 2018-06-22 PROCEDURE — 84295 ASSAY OF SERUM SODIUM: CPT

## 2018-06-22 PROCEDURE — 87798 DETECT AGENT NOS DNA AMP: CPT

## 2018-06-22 PROCEDURE — 87633 RESP VIRUS 12-25 TARGETS: CPT

## 2018-06-22 PROCEDURE — 83036 HEMOGLOBIN GLYCOSYLATED A1C: CPT

## 2018-06-22 PROCEDURE — 82728 ASSAY OF FERRITIN: CPT

## 2018-06-22 PROCEDURE — 71046 X-RAY EXAM CHEST 2 VIEWS: CPT

## 2018-06-22 PROCEDURE — 36415 COLL VENOUS BLD VENIPUNCTURE: CPT

## 2018-06-22 PROCEDURE — 82010 KETONE BODYS QUAN: CPT

## 2018-06-22 RX ORDER — LEVOTHYROXINE SODIUM 125 MCG
50 TABLET ORAL DAILY
Qty: 0 | Refills: 0 | Status: DISCONTINUED | OUTPATIENT
Start: 2018-06-22 | End: 2018-07-03

## 2018-06-22 RX ORDER — ATORVASTATIN CALCIUM 80 MG/1
40 TABLET, FILM COATED ORAL AT BEDTIME
Qty: 0 | Refills: 0 | Status: DISCONTINUED | OUTPATIENT
Start: 2018-06-22 | End: 2018-07-03

## 2018-06-22 RX ORDER — PROPRANOLOL HCL 160 MG
10 CAPSULE, EXTENDED RELEASE 24HR ORAL EVERY 12 HOURS
Qty: 0 | Refills: 0 | Status: DISCONTINUED | OUTPATIENT
Start: 2018-06-22 | End: 2018-06-22

## 2018-06-22 RX ORDER — DIVALPROEX SODIUM 500 MG/1
0 TABLET, DELAYED RELEASE ORAL
Qty: 0 | Refills: 0 | COMMUNITY

## 2018-06-22 RX ORDER — LOSARTAN POTASSIUM 100 MG/1
100 TABLET, FILM COATED ORAL DAILY
Qty: 0 | Refills: 0 | Status: DISCONTINUED | OUTPATIENT
Start: 2018-06-22 | End: 2018-07-03

## 2018-06-22 RX ORDER — FLUOXETINE HCL 10 MG
1 CAPSULE ORAL
Qty: 0 | Refills: 0 | COMMUNITY

## 2018-06-22 RX ORDER — OXCARBAZEPINE 300 MG/1
1 TABLET, FILM COATED ORAL
Qty: 0 | Refills: 0 | COMMUNITY

## 2018-06-22 RX ORDER — PROPRANOLOL HCL 160 MG
20 CAPSULE, EXTENDED RELEASE 24HR ORAL EVERY 12 HOURS
Qty: 0 | Refills: 0 | Status: DISCONTINUED | OUTPATIENT
Start: 2018-06-22 | End: 2018-07-03

## 2018-06-22 RX ORDER — HALOPERIDOL DECANOATE 100 MG/ML
2 INJECTION INTRAMUSCULAR EVERY 6 HOURS
Qty: 0 | Refills: 0 | Status: DISCONTINUED | OUTPATIENT
Start: 2018-06-22 | End: 2018-07-03

## 2018-06-22 RX ORDER — PROPRANOLOL HCL 160 MG
1 CAPSULE, EXTENDED RELEASE 24HR ORAL
Qty: 0 | Refills: 0 | COMMUNITY

## 2018-06-22 RX ORDER — AMLODIPINE BESYLATE 2.5 MG/1
5 TABLET ORAL DAILY
Qty: 0 | Refills: 0 | Status: DISCONTINUED | OUTPATIENT
Start: 2018-06-22 | End: 2018-07-03

## 2018-06-22 RX ORDER — PROPRANOLOL HCL 160 MG
1 CAPSULE, EXTENDED RELEASE 24HR ORAL
Qty: 0 | Refills: 0 | COMMUNITY
Start: 2018-06-22

## 2018-06-22 RX ORDER — SITAGLIPTIN 50 MG/1
1 TABLET, FILM COATED ORAL
Qty: 0 | Refills: 0 | COMMUNITY

## 2018-06-22 RX ADMIN — Medication 20 MILLIGRAM(S): at 22:15

## 2018-06-22 RX ADMIN — HEPARIN SODIUM 5000 UNIT(S): 5000 INJECTION INTRAVENOUS; SUBCUTANEOUS at 05:40

## 2018-06-22 RX ADMIN — Medication 50 MICROGRAM(S): at 05:40

## 2018-06-22 RX ADMIN — ATORVASTATIN CALCIUM 40 MILLIGRAM(S): 80 TABLET, FILM COATED ORAL at 22:15

## 2018-06-22 RX ADMIN — Medication 20 MILLIGRAM(S): at 05:40

## 2018-06-22 RX ADMIN — SODIUM CHLORIDE 100 MILLILITER(S): 9 INJECTION, SOLUTION INTRAVENOUS at 05:23

## 2018-06-22 RX ADMIN — AMLODIPINE BESYLATE 5 MILLIGRAM(S): 2.5 TABLET ORAL at 05:40

## 2018-06-22 NOTE — PROGRESS NOTE BEHAVIORAL HEALTH - NSBHFUPINTERVALHXFT_PSY_A_CORE
Patient seen and interviewed at bedside. He presented in bed, eating breakfast. He reports that he has been feeling better physically but continues to be depressed. He denies any thoughts to harm himself and continues to be vague about the reason why he stopped his medications and stopped eating. He reports sleeping 7hrs during the night. He denies any racing thoughts or delusions. He is AAOx3. His MMSE is 28.   Discussed with the patient a possible transferred to psychiatry when he is medically cleared. Patient agrees with the plan.  Patient left another message for his outpatient psychiatrist Dr. Arroyo. Patient seen and interviewed at bedside. He presented in bed, eating breakfast. He reports that he has been feeling better physically but continues to be depressed. He denies any thoughts to harm himself and continues to be vague about the reason why he stopped his medications and stopped eating. He reports sleeping 7hrs during the night. He denies any racing thoughts or delusions. He is AAOx3. His MMSE is 28.   Discussed with the patient a possible transferred to psychiatry when he is medically cleared. Patient agrees with the plan.  03:30pm: Dr Arroyo ([phone]) returned the call with the following collateral information. Patient had a diagnosis of bipolar disorder. He had several episodes of trini in the past (decreased need for sleep, hypersexual) which usually resolve fast. Patient's episodes of depression are usually more severe (patient stops eating and is unable to care for himself). As per Dr Arroyo, patient had been struggling with depression for several months. Dr. Arroyo decided to taper him off depakote and prozac 60mg and start him on trileptal which probably contributed to this decompensation. Patient is avoidant and low functioning at baseline. He responded well to abilify in the past, however Dr. Arroyo had to stop it due to TD.   Patient left another message for his outpatient psychiatrist Dr. Arroyo.

## 2018-06-22 NOTE — PROGRESS NOTE BEHAVIORAL HEALTH - NSBHCHARTREVIEWVS_PSY_A_CORE FT
ICU Vital Signs Last 24 Hrs  T(C): 36.6 (22 Jun 2018 05:10), Max: 36.9 (21 Jun 2018 21:14)  T(F): 97.9 (22 Jun 2018 05:10), Max: 98.4 (21 Jun 2018 21:14)  HR: 54 (22 Jun 2018 05:10) (54 - 65)  BP: 108/48 (22 Jun 2018 05:10) (107/62 - 137/56)  BP(mean): --  ABP: --  ABP(mean): --  RR: 16 (22 Jun 2018 05:10) (15 - 16)  SpO2: 96% (22 Jun 2018 05:10) (95% - 97%)
ICU Vital Signs Last 24 Hrs  T(C): 36.8 (21 Jun 2018 05:44), Max: 36.8 (20 Jun 2018 17:06)  T(F): 98.2 (21 Jun 2018 05:44), Max: 98.2 (20 Jun 2018 17:06)  HR: 52 (21 Jun 2018 05:44) (52 - 81)  BP: 139/48 (21 Jun 2018 05:44) (119/68 - 168/72)  BP(mean): --  ABP: --  ABP(mean): --  RR: 16 (21 Jun 2018 05:44) (16 - 18)  SpO2: 97% (21 Jun 2018 05:44) (96% - 97%)

## 2018-06-22 NOTE — PROGRESS NOTE BEHAVIORAL HEALTH - PROBLEM SELECTOR PLAN 2
-check patient's current mental status  -d/w patient's outpatient psychiatrist what is patient's baseline cognitive status
-check patient's current mental status  -d/w patient's outpatient psychiatrist what is patient's baseline cognitive status

## 2018-06-22 NOTE — PROGRESS NOTE BEHAVIORAL HEALTH - NSBHADMITCOUNSEL_PSY_A_CORE
diagnostic results/impressions and/or recommended studies/importance of adherence to chosen treatment
instructions for management, treatment and follow up/risk factor reduction/diagnostic results/impressions and/or recommended studies/importance of adherence to chosen treatment

## 2018-06-22 NOTE — PHYSICAL THERAPY INITIAL EVALUATION ADULT - CRITERIA FOR SKILLED THERAPEUTIC INTERVENTIONS
functional limitations in following categories/risk reduction/prevention/rehab potential/impairments found/anticipated discharge recommendation/anticipated equipment needs at discharge/therapy frequency

## 2018-06-22 NOTE — PROGRESS NOTE BEHAVIORAL HEALTH - RISK ASSESSMENT
Patient presents calm and cooperative with care. He denies any SI/HI. He has been unable to care for himself and requires further treatment
Patient presents calm and cooperative with care. He denies any SI/HI. He has been unable to care for himself and requires further treatment

## 2018-06-22 NOTE — PROGRESS NOTE BEHAVIORAL HEALTH - ORIENTATION OTHER
Patient does not seem to understand why he is here in ED, just knows that PCP wants him admitted
Patient does not seem to understand why he is here in ED, just knows that PCP wants him admitted

## 2018-06-22 NOTE — PROGRESS NOTE BEHAVIORAL HEALTH - NSBHCHARTREVIEWLAB_PSY_A_CORE FT
CBC Full  -  ( 22 Jun 2018 06:38 )  WBC Count : 9.2 K/uL  Hemoglobin : 9.8 g/dL  Hematocrit : 29.8 %  Platelet Count - Automated : 315 K/uL  Mean Cell Volume : 95.8 fL  Mean Cell Hemoglobin : 31.5 pg  Mean Cell Hemoglobin Concentration : 32.9 g/dL  Auto Neutrophil # : x  Auto Lymphocyte # : x  Auto Monocyte # : x  Auto Eosinophil # : x  Auto Basophil # : x  Auto Neutrophil % : x  Auto Lymphocyte % : x  Auto Monocyte % : x  Auto Eosinophil % : x  Auto Basophil % : x  06-22    138  |  106  |  21  ----------------------------<  153<H>  4.2   |  18<L>  |  1.30    Ca    8.8      22 Jun 2018 06:38  Phos  3.2     06-22  Mg     2.2     06-22    TPro  6.3  /  Alb  3.1<L>  /  TBili  0.3  /  DBili  x   /  AST  15  /  ALT  18  /  AlkPhos  45  06-21
CBC Full  -  ( 21 Jun 2018 07:38 )  WBC Count : 10.1 K/uL  Hemoglobin : 10.1 g/dL  Hematocrit : 30.4 %  Platelet Count - Automated : 348 K/uL  Mean Cell Volume : 94.1 fL  Mean Cell Hemoglobin : 31.3 pg  Mean Cell Hemoglobin Concentration : 33.2 g/dL  Auto Neutrophil # : x  Auto Lymphocyte # : x  Auto Monocyte # : x  Auto Eosinophil # : x  Auto Basophil # : x  Auto Neutrophil % : x  Auto Lymphocyte % : x  Auto Monocyte % : x  Auto Eosinophil % : x  Auto Basophil % : x  06-21    136  |  101  |  35<H>  ----------------------------<  151<H>  4.7   |  18<L>  |  2.02<H>    Ca    8.9      21 Jun 2018 07:39  Phos  4.1     06-21  Mg     2.2     06-21    TPro  6.3  /  Alb  3.1<L>  /  TBili  0.3  /  DBili  x   /  AST  15  /  ALT  18  /  AlkPhos  45  06-21

## 2018-06-22 NOTE — PHYSICAL THERAPY INITIAL EVALUATION ADULT - ADDITIONAL COMMENTS
Pt lives alone in elevator access apt building w/ no stairs to enter. Denies use of DME prior to this admission. No HHA, no HPT, no hx of recent falls

## 2018-06-22 NOTE — PROGRESS NOTE BEHAVIORAL HEALTH - PROBLEM SELECTOR PLAN 1
-continue to hold patient's outpatient psychiatric medications (trileptal); consider switching to another agent as the patient has poor renal function at baseline  -patient is calm and cooperative and does not require a 1:1  -obtain collateral information re patient's past treatments and recent presentation  -will continue to follow the patient

## 2018-06-23 VITALS
HEART RATE: 56 BPM | RESPIRATION RATE: 18 BRPM | SYSTOLIC BLOOD PRESSURE: 109 MMHG | DIASTOLIC BLOOD PRESSURE: 50 MMHG | TEMPERATURE: 98 F

## 2018-06-23 DIAGNOSIS — E63.9 NUTRITIONAL DEFICIENCY, UNSPECIFIED: ICD-10-CM

## 2018-06-23 RX ADMIN — ATORVASTATIN CALCIUM 40 MILLIGRAM(S): 80 TABLET, FILM COATED ORAL at 21:17

## 2018-06-23 RX ADMIN — Medication 20 MILLIGRAM(S): at 21:17

## 2018-06-23 RX ADMIN — AMLODIPINE BESYLATE 5 MILLIGRAM(S): 2.5 TABLET ORAL at 10:41

## 2018-06-23 RX ADMIN — Medication 20 MILLIGRAM(S): at 10:41

## 2018-06-23 RX ADMIN — LOSARTAN POTASSIUM 100 MILLIGRAM(S): 100 TABLET, FILM COATED ORAL at 10:42

## 2018-06-23 RX ADMIN — Medication 50 MICROGRAM(S): at 10:42

## 2018-06-23 NOTE — PROGRESS NOTE BEHAVIORAL HEALTH - SUMMARY
77 yo male with self-reported bipolar with apparently no psychiatric hospitalizations in past and multiple medical problems brought in by self accompanied by friend Lanette at the recommendation of his PCP after the patient stopped all his medications and stopped eating for the last 2 weeks. Patient reports ongoing depression symptoms and some hypomanic sx (hypersexuality) which contributed to his decision to stop his treatment. Patient's MMSE is 28. Patient's labs show improvement after IV hydration and restarting his home medications.

## 2018-06-23 NOTE — PROGRESS NOTE BEHAVIORAL HEALTH - PROBLEM SELECTOR PLAN 3
- Ensure supp.  - f/u Hb (trending down) - Ensure supp.  - f/u Hb (trending down)  - Nutrition consult

## 2018-06-24 RX ADMIN — ATORVASTATIN CALCIUM 40 MILLIGRAM(S): 80 TABLET, FILM COATED ORAL at 21:34

## 2018-06-24 RX ADMIN — Medication 20 MILLIGRAM(S): at 21:34

## 2018-06-24 RX ADMIN — AMLODIPINE BESYLATE 5 MILLIGRAM(S): 2.5 TABLET ORAL at 11:48

## 2018-06-24 RX ADMIN — LOSARTAN POTASSIUM 100 MILLIGRAM(S): 100 TABLET, FILM COATED ORAL at 11:48

## 2018-06-24 RX ADMIN — Medication 50 MICROGRAM(S): at 09:06

## 2018-06-25 DIAGNOSIS — F32.9 MAJOR DEPRESSIVE DISORDER, SINGLE EPISODE, UNSPECIFIED: ICD-10-CM

## 2018-06-25 DIAGNOSIS — I10 ESSENTIAL (PRIMARY) HYPERTENSION: ICD-10-CM

## 2018-06-25 DIAGNOSIS — E03.9 HYPOTHYROIDISM, UNSPECIFIED: ICD-10-CM

## 2018-06-25 PROCEDURE — 99233 SBSQ HOSP IP/OBS HIGH 50: CPT

## 2018-06-25 RX ADMIN — Medication 20 MILLIGRAM(S): at 10:07

## 2018-06-25 RX ADMIN — Medication 20 MILLIGRAM(S): at 21:36

## 2018-06-25 RX ADMIN — AMLODIPINE BESYLATE 5 MILLIGRAM(S): 2.5 TABLET ORAL at 10:07

## 2018-06-25 RX ADMIN — Medication 50 MICROGRAM(S): at 10:07

## 2018-06-25 RX ADMIN — ATORVASTATIN CALCIUM 40 MILLIGRAM(S): 80 TABLET, FILM COATED ORAL at 21:36

## 2018-06-25 RX ADMIN — LOSARTAN POTASSIUM 100 MILLIGRAM(S): 100 TABLET, FILM COATED ORAL at 10:07

## 2018-06-25 NOTE — BEHAVIORAL HEALTH ASSESSMENT NOTE - NSBHCHARTREVIEWLAB_PSY_A_CORE FT
Complete Blood Count in AM (06.22.18 @ 06:38)    WBC Count: 9.2 K/uL    RBC Count: 3.11 M/uL    Hemoglobin: 9.8 g/dL    Hematocrit: 29.8 %    Mean Cell Volume: 95.8 fL    Mean Cell Hemoglobin: 31.5 pg    Mean Cell Hemoglobin Conc: 32.9 g/dL    Red Cell Distrib Width: 13.3 %    Platelet Count - Automated: 315 K/uL  Basic Metabolic Panel in AM (06.22.18 @ 06:38)    Sodium, Serum: 138 mmol/L    Potassium, Serum: 4.2 mmol/L    Chloride, Serum: 106 mmol/L    Carbon Dioxide, Serum: 18 mmol/L    Anion Gap, Serum: 14 mmol/L    Blood Urea Nitrogen, Serum: 21: Result confirmed by repeated analysis after passing specimen ID/integrity  check mg/dL    Creatinine, Serum: 1.30 mg/dL    Glucose, Serum: 153 mg/dL    Calcium, Total Serum: 8.8 mg/dL    eGFR if Non : 53: The units for eGFR are ml/min/1.73m2 (normalized body surface area). The  eGFR is calculated from a serum creatinine using the CKD-EPI equation.  Other variables required for calculation are race, age and sex. Among  patients with chronic kidney disease (CKD), the eGFR is useful in  determining the stage of disease according to KDOQI CKD classification.  All eGFR results are reported numerically with the following  interpretation.          GFR                    With                        Without     (ml/min/1.73 m2)    Kidney Damage       Kidney Damage        >= 90                    Stage 1                     Normal        60-89                    Stage 2                     Decreased GFR        30-59                    Stage 3         Stage 3        15-29                    Stage 4                     Stage 4        < 15                      Stage 5                     Stage 5  Each stage of CKD assumes that the associated GFR level has been in  effect for at least 3 months. Determination of stages one and two (with  eGFR > 59 ml/min/m2) requires estimation of kidney damage for at least 3  months as defined by structural or functional abnormalities.  Limitations: All estimates of GFR will be less accurate for patientsat  extremes of muscle mass (including but not limited to frail elderly,  critically ill, or cancer patients), those with unusual diets, and those  with conditions associated with reduced secretion or extrarenal  elimination of creatinine. The eGFR equation is not recommended for use  in patients with unstable creatinine levels. mL/min/1.73M2

## 2018-06-25 NOTE — BEHAVIORAL HEALTH ASSESSMENT NOTE - NSBHCHARTREVIEWINVESTIGATE_PSY_A_CORE FT
< from: 12 Lead ECG (06.20.18 @ 17:29) >    Ventricular Rate 68 BPM    Atrial Rate 68 BPM    P-R Interval 148 ms    QRS Duration 92 ms    Q-T Interval 430 ms    QTC Calculation(Bezet) 457 ms    P Axis 77 degrees    R Axis 54 degrees    T Axis 74 degrees    Diagnosis Line Normal sinus rhythm    < end of copied text >

## 2018-06-25 NOTE — BEHAVIORAL HEALTH ASSESSMENT NOTE - CASE SUMMARY
79 yo single, domiciled alone, retired male, non-caregiver, no illicit substances with self-reported hx of bipolar (denies ever being psychiatrically hospitalized, patient's PCP denies knowing in detail patient's psychiatric history) has not seen outpatient psychiatrist Dr. Lissette Arroyo [phone] in over 1 month; PMH hypothyroidism, HTN, and COPD with recent self-reported bronchitis (elevated WBC ct 14.3) BIBS accompanied by friend Lanette [phone] at behest of patient's PCP. PCP Dr. Rodríguez was concerned because patient had stopped eating and taking all of his medications two weeks ago and was not eating well ("I was just in a fetal position"). Patient was subsequently admitted to medicine with dehydration, protein-caloric malnutrition, and REINIER which responded well to the treatment. He was later transferred to psychiatry for safety and further stabilization (his psychiatric medications have not been restarted for multiple reasons, including REINIER which has now resolved).  ;;: wakes up in the middle of the night; reports good appetite.  no complaints of pain.   MSE: sits up in bed  for interview.  fair eye contact; oriented x3;fund of knowlege intact; registers 3/3 ; recalls 3/3;; EOMS full; tongue protrusion wnl; identifies 2 fingers; denies AVH or s/h i/i/p; speech normal volume, spontaneous, clear; no evidence of tremor or rigidity;   preoccupied and blocked; mood sad; affect sad and constricted.  affect mostly thought congruent.  no strangeness of language use.  guarded and evasive; i&j: fair to poor; accepts treatment; however not reflective on sxs or situation.  PsySoc: Denies prior admissions; HLD DM2 hypothyroid;  h/o tonsillectomy; cataract surgery; ed: Masters in English; for 25yr worked as a   but employer  ten years ago; unmarried; no children; last partner 6 yrs ago.  Suicide attempt 6 years ago turning on the gas.  Has a new psychiatrist Dr. Galvez.  Was to be on Abilify Lamictal Trileptal; acknowledges past manic episodes.

## 2018-06-25 NOTE — BEHAVIORAL HEALTH ASSESSMENT NOTE - PROBLEM SELECTOR PLAN 1
in a pt with self-reported bipolar d/o, likely bipolar 2 - collateral from pt's outpt psychiatrist pending.

## 2018-06-25 NOTE — BEHAVIORAL HEALTH ASSESSMENT NOTE - MODIFICATIONS
Regime of Abilify , Lamictal, and Trileptal can be co-titrated together for mood; had been tapered off of Prozac but antidepressant may be needed.

## 2018-06-25 NOTE — BEHAVIORAL HEALTH ASSESSMENT NOTE - SUMMARY
77 yo domiciled  M PPH bipolar d/o 1 self-aborted SA in 2012 BIB friend at behest of pt's PCP after pt reported having discontinued all of his meds and stopping eating. Reason for current decompensation remains unclear and is likely associated with recent medication changes vs acute infection (pt likely had bronchitis and could have decompensated 2/2 delirium). Pt's medical condition has been addressed and he has improved psychiatrically and is currently likely close to baseline. He continues to require an inpatient psychiatric hospitalization for further w/u, medication management and safe disposition planning given the unclear nature of his initial presentation.

## 2018-06-25 NOTE — BEHAVIORAL HEALTH ASSESSMENT NOTE - NSBHCHARTREVIEWVS_PSY_A_CORE FT
Vital Signs Last 24 Hrs  T(C): 36.3 (25 Jun 2018 17:07), Max: 37.1 (25 Jun 2018 06:18)  T(F): 97.4 (25 Jun 2018 17:07), Max: 98.7 (25 Jun 2018 06:18)  HR: 67 (25 Jun 2018 17:07) (61 - 67)  BP: 123/65 (25 Jun 2018 17:07) (123/65 - 128/68)  BP(mean): --  RR: 20 (25 Jun 2018 17:07) (18 - 20)  SpO2: --

## 2018-06-25 NOTE — BEHAVIORAL HEALTH ASSESSMENT NOTE - HPI (INCLUDE ILLNESS QUALITY, SEVERITY, DURATION, TIMING, CONTEXT, MODIFYING FACTORS, ASSOCIATED SIGNS AND SYMPTOMS)
77 yo single, domiciled alone, retired male, non-caregiver, no illicit substances with self-reported hx of bipolar (denies ever being psychiatrically hospitalized, patient's PCP denies knowing in detail patient's psychiatric history) has not seen outpatient psychiatrist Dr. Lissette Arroyo [phone] in over 1 month; PMH hypothyroidism, HTN, and COPD with recent self-reported bronchitis (elevated WBC ct 14.3) BIBS accompanied by friend Lanette [phone] at behest of patient's PCP. PCP Dr. Rodríguez was concerned because patient had stopped eating and taking all of his medications two weeks ago and was not eating well ("I was just in a fetal position"). Patient was subsequently admitted to medicine with dehydration, protein-caloric malnutrition, and REINIER which responded well to the treatment. He was later transferred to psychiatry for safety and further stabilization (his psychiatric medications have not been restarted for multiple reasons, including REINIER which has now resolved).  During interview, patient is pleasant and cooperative. Patient exhibits slight psychomotor agitation, constantly tapping his toes on the ground, and mild dysarthria (pt edentulous). Patient states that his current mood is “fine because there are no stressors here”. Patient reports good appetite and enjoys the food. Patient complains of poor sleep. He sleeps 2-3 hours, stays awake for 2 hours, and then goes back to sleep for 2-3 more hours before being waken up at 6am. Patient is very focused on learning how he can maintain his medications after discharge and was interested in having a home health aide. When asked about his interested in attending groups, he states that he is not interested in it right now. He states that he has not interacted with people in a group setting for a long time and is not sure how he would handle the situation. Patient denies any current suicidal or homicidal ideations, or any auditory or visual hallucinations.    PPH: Patient has had no previous psychiatric hospitalizations. Patient was previously on Prozac, Abilify, Lamictal, and Depakote before medication was changed to Trileptal. Timing of discontinuation and changing of medications remains unclear - pt's outpatient psychiatrist contacted several times including today - no call back. Patient has had one previous suicidal ideation 5-6 years ago (2012) using gas, but realizes it was “a stupid idea because it would blow up the entire building”. Patient reports starting to see his outpatient psychiatrist after that incident. Patient denies current use of alcohol, tobacco (quit 2012- past use: 40 pack-years), or other substances.     PMH:  Hypothyroidism  HTN  Pre-diabetes  Bronchitis  Hyperlipidemia  Acute Kidney Injury    Past Surgical History: Cataracts removal (both eyes), Tonsillectomy     Allergies: NKDA    Current medications:   	Amlodipine 5mg, daily  	Atorvastatin 40 mg, daily  	Levothyroxine 50mcg, daily    	Losartan 100mg, daily  	Propranolol 20 mg, BID

## 2018-06-25 NOTE — BEHAVIORAL HEALTH ASSESSMENT NOTE - NSBHADMITCOUNSEL_PSY_A_CORE
instructions for management, treatment and follow up/risk factor reduction/prognosis/client/family/caregiver education/diagnostic results/impressions and/or recommended studies/risks and benefits of treatment options/importance of adherence to chosen treatment

## 2018-06-25 NOTE — BEHAVIORAL HEALTH ASSESSMENT NOTE - NSBHSOCIALHXDETAILSFT_PSY_A_CORE
Patient is single, domiciled and living by himself in [facility]. Patient was born is [place], moved to [place] and then to [place]. Patient’s highest level of education is a Masters in English. Patient was an  in [employer] for 5 years until the late 1960s. 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 After that he was a  until the early 1980s. 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     Patient is currently single and reports his last relationship was 10 years ago. Patient reports currently not interested in being in a relationship as it “is too much work”. Patient reports, however, being in relationships with both men and women in the past.     Per Dr Galvez, pt lacks structure and his social support system is weak - he will benefit from a more structured environment.

## 2018-06-25 NOTE — BEHAVIORAL HEALTH ASSESSMENT NOTE - NSBHADMITIPSTRENGTH_PSY_A_CORE
Able to manage surrounding demands/opportunities/Financial stability/Knowledge of medications/Cooperative with treatment/Attempting to realize his/her potential/Creative/Has supportive interpersonal relationships with family, friends or peers/Has vocational interests or hobbies/Intelligent/Able to set and pursue goals/Has access to housing/residential stability

## 2018-06-26 LAB
24R-OH-CALCIDIOL SERPL-MCNC: 37.3 NG/ML — SIGNIFICANT CHANGE UP (ref 30–80)
ALBUMIN SERPL ELPH-MCNC: 3.3 G/DL — SIGNIFICANT CHANGE UP (ref 3.3–5)
ALP SERPL-CCNC: 57 U/L — SIGNIFICANT CHANGE UP (ref 40–120)
ALT FLD-CCNC: 19 U/L — SIGNIFICANT CHANGE UP (ref 10–45)
ANION GAP SERPL CALC-SCNC: 15 MMOL/L — SIGNIFICANT CHANGE UP (ref 5–17)
AST SERPL-CCNC: 17 U/L — SIGNIFICANT CHANGE UP (ref 10–40)
BASOPHILS NFR BLD AUTO: 0.6 % — SIGNIFICANT CHANGE UP (ref 0–2)
BILIRUB SERPL-MCNC: 0.3 MG/DL — SIGNIFICANT CHANGE UP (ref 0.2–1.2)
BUN SERPL-MCNC: 24 MG/DL — HIGH (ref 7–23)
CALCIUM SERPL-MCNC: 9.2 MG/DL — SIGNIFICANT CHANGE UP (ref 8.4–10.5)
CHLORIDE SERPL-SCNC: 98 MMOL/L — SIGNIFICANT CHANGE UP (ref 96–108)
CHOLEST SERPL-MCNC: 133 MG/DL — SIGNIFICANT CHANGE UP (ref 10–199)
CO2 SERPL-SCNC: 22 MMOL/L — SIGNIFICANT CHANGE UP (ref 22–31)
CREAT SERPL-MCNC: 1.24 MG/DL — SIGNIFICANT CHANGE UP (ref 0.5–1.3)
EOSINOPHIL NFR BLD AUTO: 5 % — SIGNIFICANT CHANGE UP (ref 0–6)
GLUCOSE SERPL-MCNC: 161 MG/DL — HIGH (ref 70–99)
HCT VFR BLD CALC: 31.8 % — LOW (ref 39–50)
HDLC SERPL-MCNC: 35 MG/DL — LOW (ref 40–125)
HGB BLD-MCNC: 10.4 G/DL — LOW (ref 13–17)
LIPID PNL WITH DIRECT LDL SERPL: 73 MG/DL — SIGNIFICANT CHANGE UP
LYMPHOCYTES # BLD AUTO: 21.9 % — SIGNIFICANT CHANGE UP (ref 13–44)
MCHC RBC-ENTMCNC: 30.5 PG — SIGNIFICANT CHANGE UP (ref 27–34)
MCHC RBC-ENTMCNC: 32.7 G/DL — SIGNIFICANT CHANGE UP (ref 32–36)
MCV RBC AUTO: 93.3 FL — SIGNIFICANT CHANGE UP (ref 80–100)
MONOCYTES NFR BLD AUTO: 9.5 % — SIGNIFICANT CHANGE UP (ref 2–14)
NEUTROPHILS NFR BLD AUTO: 63 % — SIGNIFICANT CHANGE UP (ref 43–77)
PLATELET # BLD AUTO: 396 K/UL — SIGNIFICANT CHANGE UP (ref 150–400)
POTASSIUM SERPL-MCNC: 4.5 MMOL/L — SIGNIFICANT CHANGE UP (ref 3.5–5.3)
POTASSIUM SERPL-SCNC: 4.5 MMOL/L — SIGNIFICANT CHANGE UP (ref 3.5–5.3)
PROT SERPL-MCNC: 7.7 G/DL — SIGNIFICANT CHANGE UP (ref 6–8.3)
RBC # BLD: 3.41 M/UL — LOW (ref 4.2–5.8)
RBC # FLD: 13.9 % — SIGNIFICANT CHANGE UP (ref 10.3–16.9)
SODIUM SERPL-SCNC: 135 MMOL/L — SIGNIFICANT CHANGE UP (ref 135–145)
T PALLIDUM AB TITR SER: NEGATIVE — SIGNIFICANT CHANGE UP
TOTAL CHOLESTEROL/HDL RATIO MEASUREMENT: 3.8 RATIO — SIGNIFICANT CHANGE UP (ref 3.4–9.6)
TRIGL SERPL-MCNC: 123 MG/DL — SIGNIFICANT CHANGE UP (ref 10–149)
WBC # BLD: 12.3 K/UL — HIGH (ref 3.8–10.5)
WBC # FLD AUTO: 12.3 K/UL — HIGH (ref 3.8–10.5)

## 2018-06-26 PROCEDURE — 99232 SBSQ HOSP IP/OBS MODERATE 35: CPT

## 2018-06-26 RX ORDER — OXCARBAZEPINE 300 MG/1
300 TABLET, FILM COATED ORAL
Qty: 0 | Refills: 0 | Status: DISCONTINUED | OUTPATIENT
Start: 2018-06-26 | End: 2018-06-27

## 2018-06-26 RX ADMIN — Medication 20 MILLIGRAM(S): at 22:00

## 2018-06-26 RX ADMIN — OXCARBAZEPINE 300 MILLIGRAM(S): 300 TABLET, FILM COATED ORAL at 22:00

## 2018-06-26 RX ADMIN — LOSARTAN POTASSIUM 100 MILLIGRAM(S): 100 TABLET, FILM COATED ORAL at 10:15

## 2018-06-26 RX ADMIN — ATORVASTATIN CALCIUM 40 MILLIGRAM(S): 80 TABLET, FILM COATED ORAL at 22:00

## 2018-06-26 RX ADMIN — AMLODIPINE BESYLATE 5 MILLIGRAM(S): 2.5 TABLET ORAL at 10:15

## 2018-06-26 RX ADMIN — Medication 50 MICROGRAM(S): at 10:15

## 2018-06-26 RX ADMIN — Medication 20 MILLIGRAM(S): at 10:15

## 2018-06-26 NOTE — PROGRESS NOTE BEHAVIORAL HEALTH - OTHER
older than stated age foot tapping not assessed 2/2 pt being edentulous foot tapping, tongue movements with walker

## 2018-06-26 NOTE — PROGRESS NOTE BEHAVIORAL HEALTH - NS ED [facility] MED ROS GASTROINTESTINAL
Pediatric Mobile Admit Note    Subjective:     Gianna Reno is a male infant Delphine Izabela [[id number]]   39w0d     Prenatal Labs: Information for the patient's mother:  Delphine Maheshramiro [[id number]]     baby    Objective:        10/03 1901 - 10/05 07  Delphine Maheshramiro [[id number]]     Recent Labs      10/04/Caldwell, Ortolani, gluteal creases equal  : lactation support.      Signed By:  John Michelle DO     2017 No complaints

## 2018-06-26 NOTE — PROGRESS NOTE BEHAVIORAL HEALTH - SUMMARY
79 yo domiciled  M PPH bipolar d/o 1 self-aborted SA in 2012 BIB friend at behest of pt's PCP after pt reported having discontinued all of his meds and stopping eating. Reason for current decompensation remains unclear and is likely associated with recent medication changes vs acute infection (pt likely had bronchitis and could have decompensated 2/2 delirium). Pt's medical condition has been addressed and he has improved psychiatrically and is currently likely close to baseline. He continues to require an inpatient psychiatric hospitalization for further w/u, medication management and safe disposition planning given the unclear nature of his initial presentation.

## 2018-06-27 DIAGNOSIS — R73.03 PREDIABETES: ICD-10-CM

## 2018-06-27 LAB
ALBUMIN SERPL ELPH-MCNC: 3.6 G/DL — SIGNIFICANT CHANGE UP (ref 3.3–5)
ALP SERPL-CCNC: 45 U/L — SIGNIFICANT CHANGE UP (ref 40–120)
ALT FLD-CCNC: 15 U/L — SIGNIFICANT CHANGE UP (ref 10–45)
ANION GAP SERPL CALC-SCNC: 12 MMOL/L — SIGNIFICANT CHANGE UP (ref 5–17)
AST SERPL-CCNC: 14 U/L — SIGNIFICANT CHANGE UP (ref 10–40)
BILIRUB SERPL-MCNC: 0.2 MG/DL — SIGNIFICANT CHANGE UP (ref 0.2–1.2)
BUN SERPL-MCNC: 23 MG/DL — SIGNIFICANT CHANGE UP (ref 7–23)
CALCIUM SERPL-MCNC: 9.7 MG/DL — SIGNIFICANT CHANGE UP (ref 8.4–10.5)
CHLORIDE SERPL-SCNC: 100 MMOL/L — SIGNIFICANT CHANGE UP (ref 96–108)
CO2 SERPL-SCNC: 24 MMOL/L — SIGNIFICANT CHANGE UP (ref 22–31)
CREAT SERPL-MCNC: 1.33 MG/DL — HIGH (ref 0.5–1.3)
FOLATE SERPL-MCNC: 3.2 NG/ML — LOW
GLUCOSE SERPL-MCNC: 141 MG/DL — HIGH (ref 70–99)
HCT VFR BLD CALC: 31.7 % — LOW (ref 39–50)
HGB BLD-MCNC: 10.1 G/DL — LOW (ref 13–17)
MCHC RBC-ENTMCNC: 30.2 PG — SIGNIFICANT CHANGE UP (ref 27–34)
MCHC RBC-ENTMCNC: 31.9 G/DL — LOW (ref 32–36)
MCV RBC AUTO: 94.9 FL — SIGNIFICANT CHANGE UP (ref 80–100)
PLATELET # BLD AUTO: 345 K/UL — SIGNIFICANT CHANGE UP (ref 150–400)
POTASSIUM SERPL-MCNC: 4.9 MMOL/L — SIGNIFICANT CHANGE UP (ref 3.5–5.3)
POTASSIUM SERPL-SCNC: 4.9 MMOL/L — SIGNIFICANT CHANGE UP (ref 3.5–5.3)
PROT SERPL-MCNC: 7.5 G/DL — SIGNIFICANT CHANGE UP (ref 6–8.3)
RBC # BLD: 3.34 M/UL — LOW (ref 4.2–5.8)
RBC # FLD: 14 % — SIGNIFICANT CHANGE UP (ref 10.3–16.9)
SODIUM SERPL-SCNC: 136 MMOL/L — SIGNIFICANT CHANGE UP (ref 135–145)
VIT B12 SERPL-MCNC: 1688 PG/ML — HIGH (ref 232–1245)
WBC # BLD: 10.4 K/UL — SIGNIFICANT CHANGE UP (ref 3.8–10.5)
WBC # FLD AUTO: 10.4 K/UL — SIGNIFICANT CHANGE UP (ref 3.8–10.5)

## 2018-06-27 PROCEDURE — 99232 SBSQ HOSP IP/OBS MODERATE 35: CPT

## 2018-06-27 RX ORDER — FOLIC ACID 0.8 MG
1 TABLET ORAL DAILY
Qty: 0 | Refills: 0 | Status: DISCONTINUED | OUTPATIENT
Start: 2018-06-27 | End: 2018-07-03

## 2018-06-27 RX ORDER — OXCARBAZEPINE 300 MG/1
600 TABLET, FILM COATED ORAL
Qty: 0 | Refills: 0 | Status: DISCONTINUED | OUTPATIENT
Start: 2018-06-27 | End: 2018-06-29

## 2018-06-27 RX ADMIN — Medication 20 MILLIGRAM(S): at 21:42

## 2018-06-27 RX ADMIN — ATORVASTATIN CALCIUM 40 MILLIGRAM(S): 80 TABLET, FILM COATED ORAL at 21:42

## 2018-06-27 RX ADMIN — AMLODIPINE BESYLATE 5 MILLIGRAM(S): 2.5 TABLET ORAL at 11:35

## 2018-06-27 RX ADMIN — Medication 20 MILLIGRAM(S): at 10:12

## 2018-06-27 RX ADMIN — Medication 50 MICROGRAM(S): at 10:12

## 2018-06-27 RX ADMIN — OXCARBAZEPINE 300 MILLIGRAM(S): 300 TABLET, FILM COATED ORAL at 10:12

## 2018-06-27 RX ADMIN — OXCARBAZEPINE 600 MILLIGRAM(S): 300 TABLET, FILM COATED ORAL at 21:42

## 2018-06-27 RX ADMIN — LOSARTAN POTASSIUM 100 MILLIGRAM(S): 100 TABLET, FILM COATED ORAL at 10:12

## 2018-06-28 DIAGNOSIS — F31.9 BIPOLAR DISORDER, UNSPECIFIED: ICD-10-CM

## 2018-06-28 DIAGNOSIS — E46 UNSPECIFIED PROTEIN-CALORIE MALNUTRITION: ICD-10-CM

## 2018-06-28 DIAGNOSIS — N17.0 ACUTE KIDNEY FAILURE WITH TUBULAR NECROSIS: ICD-10-CM

## 2018-06-28 DIAGNOSIS — Z87.891 PERSONAL HISTORY OF NICOTINE DEPENDENCE: ICD-10-CM

## 2018-06-28 DIAGNOSIS — E44.0 MODERATE PROTEIN-CALORIE MALNUTRITION: ICD-10-CM

## 2018-06-28 DIAGNOSIS — R63.0 ANOREXIA: ICD-10-CM

## 2018-06-28 DIAGNOSIS — E87.1 HYPO-OSMOLALITY AND HYPONATREMIA: ICD-10-CM

## 2018-06-28 DIAGNOSIS — D64.9 ANEMIA, UNSPECIFIED: ICD-10-CM

## 2018-06-28 DIAGNOSIS — R41.89 OTHER SYMPTOMS AND SIGNS INVOLVING COGNITIVE FUNCTIONS AND AWARENESS: ICD-10-CM

## 2018-06-28 DIAGNOSIS — E86.0 DEHYDRATION: ICD-10-CM

## 2018-06-28 DIAGNOSIS — F32.2 MAJOR DEPRESSIVE DISORDER, SINGLE EPISODE, SEVERE WITHOUT PSYCHOTIC FEATURES: ICD-10-CM

## 2018-06-28 DIAGNOSIS — E87.2 ACIDOSIS: ICD-10-CM

## 2018-06-28 DIAGNOSIS — E88.89 OTHER SPECIFIED METABOLIC DISORDERS: ICD-10-CM

## 2018-06-28 LAB
APPEARANCE UR: CLEAR — SIGNIFICANT CHANGE UP
BILIRUB UR-MCNC: NEGATIVE — SIGNIFICANT CHANGE UP
COLOR SPEC: YELLOW — SIGNIFICANT CHANGE UP
DIFF PNL FLD: NEGATIVE — SIGNIFICANT CHANGE UP
GLUCOSE UR QL: 100
HBA1C BLD-MCNC: 6.4 % — HIGH (ref 4–5.6)
KETONES UR-MCNC: NEGATIVE — SIGNIFICANT CHANGE UP
LEUKOCYTE ESTERASE UR-ACNC: NEGATIVE — SIGNIFICANT CHANGE UP
NITRITE UR-MCNC: NEGATIVE — SIGNIFICANT CHANGE UP
PH UR: 5.5 — SIGNIFICANT CHANGE UP (ref 5–8)
PROT UR-MCNC: 30 MG/DL
SP GR SPEC: 1.02 — SIGNIFICANT CHANGE UP (ref 1–1.03)
UROBILINOGEN FLD QL: 0.2 E.U./DL — SIGNIFICANT CHANGE UP

## 2018-06-28 PROCEDURE — 99232 SBSQ HOSP IP/OBS MODERATE 35: CPT

## 2018-06-28 RX ADMIN — OXCARBAZEPINE 600 MILLIGRAM(S): 300 TABLET, FILM COATED ORAL at 10:38

## 2018-06-28 RX ADMIN — ATORVASTATIN CALCIUM 40 MILLIGRAM(S): 80 TABLET, FILM COATED ORAL at 21:47

## 2018-06-28 RX ADMIN — Medication 50 MICROGRAM(S): at 10:16

## 2018-06-28 RX ADMIN — Medication 20 MILLIGRAM(S): at 10:37

## 2018-06-28 RX ADMIN — OXCARBAZEPINE 600 MILLIGRAM(S): 300 TABLET, FILM COATED ORAL at 21:47

## 2018-06-28 RX ADMIN — Medication 1 MILLIGRAM(S): at 10:15

## 2018-06-28 RX ADMIN — Medication 20 MILLIGRAM(S): at 21:47

## 2018-06-28 RX ADMIN — AMLODIPINE BESYLATE 5 MILLIGRAM(S): 2.5 TABLET ORAL at 10:37

## 2018-06-28 RX ADMIN — LOSARTAN POTASSIUM 100 MILLIGRAM(S): 100 TABLET, FILM COATED ORAL at 10:37

## 2018-06-29 PROCEDURE — 99232 SBSQ HOSP IP/OBS MODERATE 35: CPT

## 2018-06-29 RX ORDER — OXCARBAZEPINE 300 MG/1
300 TABLET, FILM COATED ORAL
Qty: 0 | Refills: 0 | Status: DISCONTINUED | OUTPATIENT
Start: 2018-06-29 | End: 2018-07-03

## 2018-06-29 RX ADMIN — Medication 1 MILLIGRAM(S): at 10:32

## 2018-06-29 RX ADMIN — OXCARBAZEPINE 600 MILLIGRAM(S): 300 TABLET, FILM COATED ORAL at 10:32

## 2018-06-29 RX ADMIN — Medication 50 MICROGRAM(S): at 10:32

## 2018-06-29 RX ADMIN — AMLODIPINE BESYLATE 5 MILLIGRAM(S): 2.5 TABLET ORAL at 10:32

## 2018-06-29 RX ADMIN — OXCARBAZEPINE 300 MILLIGRAM(S): 300 TABLET, FILM COATED ORAL at 21:29

## 2018-06-29 RX ADMIN — LOSARTAN POTASSIUM 100 MILLIGRAM(S): 100 TABLET, FILM COATED ORAL at 10:32

## 2018-06-29 RX ADMIN — Medication 20 MILLIGRAM(S): at 21:29

## 2018-06-29 RX ADMIN — ATORVASTATIN CALCIUM 40 MILLIGRAM(S): 80 TABLET, FILM COATED ORAL at 21:29

## 2018-06-29 RX ADMIN — Medication 20 MILLIGRAM(S): at 10:34

## 2018-06-29 NOTE — PROGRESS NOTE BEHAVIORAL HEALTH - SUMMARY
79 yo domiciled  M PPH bipolar d/o 1 self-aborted SA in 2012 BIB friend at behest of pt's PCP after pt reported having discontinued all of his meds and stopping eating. Reason for current decompensation remains unclear and is likely associated with recent medication changes vs acute infection (pt likely had bronchitis and could have decompensated 2/2 delirium). Pt's medical condition has been addressed and he has improved psychiatrically and is currently likely close to baseline. Patinet was started on his home Trileptal 300 bid - unable to increase the dose after pt feeling dizzy upon the dose increase. Pt continues to require an inpatient psychiatric hospitalization for further w/u, medication management and safe disposition planning given the unclear nature of his initial presentation.

## 2018-06-30 RX ADMIN — AMLODIPINE BESYLATE 5 MILLIGRAM(S): 2.5 TABLET ORAL at 09:47

## 2018-06-30 RX ADMIN — OXCARBAZEPINE 300 MILLIGRAM(S): 300 TABLET, FILM COATED ORAL at 09:48

## 2018-06-30 RX ADMIN — OXCARBAZEPINE 300 MILLIGRAM(S): 300 TABLET, FILM COATED ORAL at 22:23

## 2018-06-30 RX ADMIN — Medication 1 MILLIGRAM(S): at 09:47

## 2018-06-30 RX ADMIN — Medication 20 MILLIGRAM(S): at 09:48

## 2018-06-30 RX ADMIN — ATORVASTATIN CALCIUM 40 MILLIGRAM(S): 80 TABLET, FILM COATED ORAL at 22:23

## 2018-06-30 RX ADMIN — Medication 50 MICROGRAM(S): at 09:48

## 2018-06-30 RX ADMIN — LOSARTAN POTASSIUM 100 MILLIGRAM(S): 100 TABLET, FILM COATED ORAL at 09:48

## 2018-06-30 RX ADMIN — Medication 20 MILLIGRAM(S): at 22:23

## 2018-07-01 RX ORDER — LOPERAMIDE HCL 2 MG
2 TABLET ORAL ONCE
Qty: 0 | Refills: 0 | Status: COMPLETED | OUTPATIENT
Start: 2018-07-01 | End: 2018-07-01

## 2018-07-01 RX ADMIN — Medication 20 MILLIGRAM(S): at 10:50

## 2018-07-01 RX ADMIN — Medication 20 MILLIGRAM(S): at 21:32

## 2018-07-01 RX ADMIN — Medication 1 MILLIGRAM(S): at 10:50

## 2018-07-01 RX ADMIN — ATORVASTATIN CALCIUM 40 MILLIGRAM(S): 80 TABLET, FILM COATED ORAL at 21:32

## 2018-07-01 RX ADMIN — LOSARTAN POTASSIUM 100 MILLIGRAM(S): 100 TABLET, FILM COATED ORAL at 10:50

## 2018-07-01 RX ADMIN — Medication 2 MILLIGRAM(S): at 00:20

## 2018-07-01 RX ADMIN — OXCARBAZEPINE 300 MILLIGRAM(S): 300 TABLET, FILM COATED ORAL at 10:50

## 2018-07-01 RX ADMIN — OXCARBAZEPINE 300 MILLIGRAM(S): 300 TABLET, FILM COATED ORAL at 21:31

## 2018-07-01 RX ADMIN — AMLODIPINE BESYLATE 5 MILLIGRAM(S): 2.5 TABLET ORAL at 10:50

## 2018-07-01 RX ADMIN — Medication 50 MICROGRAM(S): at 10:50

## 2018-07-02 PROCEDURE — 99232 SBSQ HOSP IP/OBS MODERATE 35: CPT

## 2018-07-02 RX ADMIN — Medication 20 MILLIGRAM(S): at 21:34

## 2018-07-02 RX ADMIN — LOSARTAN POTASSIUM 100 MILLIGRAM(S): 100 TABLET, FILM COATED ORAL at 10:40

## 2018-07-02 RX ADMIN — AMLODIPINE BESYLATE 5 MILLIGRAM(S): 2.5 TABLET ORAL at 10:37

## 2018-07-02 RX ADMIN — OXCARBAZEPINE 300 MILLIGRAM(S): 300 TABLET, FILM COATED ORAL at 10:37

## 2018-07-02 RX ADMIN — Medication 50 MICROGRAM(S): at 10:37

## 2018-07-02 RX ADMIN — Medication 1 MILLIGRAM(S): at 10:37

## 2018-07-02 RX ADMIN — Medication 20 MILLIGRAM(S): at 10:37

## 2018-07-02 RX ADMIN — OXCARBAZEPINE 300 MILLIGRAM(S): 300 TABLET, FILM COATED ORAL at 21:34

## 2018-07-02 RX ADMIN — ATORVASTATIN CALCIUM 40 MILLIGRAM(S): 80 TABLET, FILM COATED ORAL at 21:34

## 2018-07-02 NOTE — PROGRESS NOTE BEHAVIORAL HEALTH - MODIFICATIONS
possible use of Trileptal for mood;  continue current regime of  Lipitor for HLD;Cozaar and  Norvasc for HTN; Synthroid for hypothroidism;  Lithium is being considered and may be appropriate for mood (both depression and to prevent manic reaction).
continues stable on current regime;  begin transition to aftercare. ; Trileptal should be maintained and consideration of adding an SSRI such as Lexapro for depression.
continue Trileptal (oxycarbamazepine) 600mg po bid  as a mood stabiizer and consider  an SSRI such as Celexa or Lexapro for depression.  Note that patient had been taken off of Prozac.  Transition to aftercare if stable.   Continue meds for medical comorbidies:  Amlodipine 5mg, daily for HTN;  Atorvastatin 40 mg, daily for HLD; Levothyroxine 50mcg, daily for hypothyroidism ;Losartan 100mg, daily for HTN; Propranolol 20 mg, BID for possible tremor and also for mood.
continues stable on current regime;  begin transition to aftercare. ; Trileptal should be maintained and consideration of adding an SSRI such as Lexapro for depression.
continues somewhat anxious and sad ;  no lability; Trileptal should be maintained and consideration of adding an SSRI such as Lexapro for depression. ; med f/u for urinary frequency; u/a. (last u/a marginal on 6/20)

## 2018-07-02 NOTE — PROGRESS NOTE BEHAVIORAL HEALTH - CASE SUMMARY
see above hx   ;;06/26: awakes in the middle of the night and cannot sleep for three hours;  appetite ok; no pain issues; needs walker to walk but feels stable on feet. staff reports some grandiosity but  patient not  pressured or labile.  MSE:  standing in the hallway;  cognition is intact; denies AVH or s/h i/i/p; speech normal volume, spontaneous, clear; normal gait; no evidence of tremor or rigidity.  mood and affect slightly anxious.  affect mostly thought congruent; no strangeness of language use; i&j: fair to poor; accepts treatment; however not reflective on sxs or situation.
see above hx  ;;06/29: Talks about needing walker to steady self;  sleep appetite good; no pain issues.    MSE: standing on line for medications;   .  good eye contact; clean;  cognition is intact; oriented ; denies AVH or s/h i/i/p; speech normal volume, spontaneous, clear; mood sad; affect sad and constricted; mood and affect slightly anxious.  constricted affect.  no strangeness of language use.  i&j: fair to poor; appears to accept treatments and need to treat sxs.  while speech is slow; coherent
see above hx  ;;06/27: reports good sleep and appetite.  reports good appetite; no complaints of pain.   no behavioral issues;  cooperative; compliant;  MSE: lying in bed but stis up  for interview.  good eye contact; clean;  cognition is intact; oriented x3; denies AVH or s/h i/i/p; speech normal volume, spontaneous, clear; mood sad; affect sad and constricted; mood and affect slightly anxious.  constricted affect.  no strangeness of language use.  i&j: fair to poor; appears to accept treatments and need to treat sxs.  while speech is slow; coherent and future oriented wanting to leave soon.
see above hx  ;;06/29: Talks about needing walker to steady self;  sleep appetite good; no pain issues.    MSE: standing on line for medications;   .  good eye contact; clean;  cognition is intact; oriented ; denies AVH or s/h i/i/p; speech normal volume, spontaneous, clear; mood sad; affect sad and constricted; mood and affect slightly anxious.  constricted affect.  no strangeness of language use.  i&j: fair to poor; appears to accept treatments and need to treat sxs.  while speech is slow; coherent
see above hx  ;;06/28: sleep appetite good; no pain;    MSE: sits by the nursing station  .  good eye contact; clean;  cognition is intact; oriented ; denies AVH or s/h i/i/p; speech normal volume, spontaneous, clear; mood sad; affect sad and constricted; mood and affect slightly anxious.  constricted affect.  no strangeness of language use.  i&j: fair to poor; appears to accept treatments and need to treat sxs.  while speech is slow; coherent

## 2018-07-02 NOTE — PROGRESS NOTE BEHAVIORAL HEALTH - SUMMARY
77 yo domiciled  M PPH bipolar d/o 1 self-aborted SA in 2012 BIB friend at behest of pt's PCP after pt reported having discontinued all of his meds and stopping eating. Reason for current decompensation remains unclear and is likely associated with recent medication changes vs acute infection (pt likely had bronchitis and could have decompensated 2/2 delirium). Pt's medical condition has been addressed and he has improved psychiatrically and is currently likely close to baseline. He continues to require an inpatient psychiatric hospitalization for further w/u, medication management and safe disposition planning given the unclear nature of his initial presentation.       ;;7/2: conitnues to improve on current regime; recent diarrhea but no complaints today;  on Trileptal 300mg po bid for mood as well as Propranolol  20mg po q 12h for mood and bp.  Consider adding an SNRI in view of  no evidence of trini and tolerance of mood stabilizer.  May need to raise Trileptal somewhat first. 79 yo domiciled  M PPH bipolar d/o 1 self-aborted SA in 2012 BIB friend at behest of pt's PCP after pt reported having discontinued all of his meds and stopping eating. Reason for current decompensation remains unclear and is likely associated with recent medication changes vs acute infection (pt likely had bronchitis and could have decompensated 2/2 delirium). Pt's medical condition has been addressed and he has improved psychiatrically and is currently likely close to baseline. He continues to require an inpatient psychiatric hospitalization for further w/u, medication management and safe disposition planning given the unclear nature of his initial presentation.       ;;7/2: continues to improve on current regime; recent diarrhea but no complaints today;  on Trileptal 300mg po bid for mood as well as Propranolol  20mg po q 12h for mood and bp.  Consider adding an SNRI in view of  no evidence of trini and tolerance of mood stabilizer.  May need to raise Trileptal somewhat first.

## 2018-07-03 VITALS
SYSTOLIC BLOOD PRESSURE: 119 MMHG | WEIGHT: 158.95 LBS | DIASTOLIC BLOOD PRESSURE: 71 MMHG | TEMPERATURE: 99 F | HEART RATE: 63 BPM | RESPIRATION RATE: 20 BRPM

## 2018-07-03 PROCEDURE — 99238 HOSP IP/OBS DSCHRG MGMT 30/<: CPT

## 2018-07-03 RX ORDER — FOLIC ACID 0.8 MG
1 TABLET ORAL
Qty: 0 | Refills: 0 | COMMUNITY
Start: 2018-07-03

## 2018-07-03 RX ORDER — LEVOTHYROXINE SODIUM 125 MCG
1 TABLET ORAL
Qty: 15 | Refills: 0 | OUTPATIENT
Start: 2018-07-03 | End: 2018-07-17

## 2018-07-03 RX ORDER — SITAGLIPTIN 50 MG/1
1 TABLET, FILM COATED ORAL
Qty: 0 | Refills: 0 | COMMUNITY
Start: 2018-07-03

## 2018-07-03 RX ORDER — OXCARBAZEPINE 300 MG/1
1 TABLET, FILM COATED ORAL
Qty: 0 | Refills: 0 | COMMUNITY
Start: 2018-07-03

## 2018-07-03 RX ORDER — SITAGLIPTIN 50 MG/1
1 TABLET, FILM COATED ORAL
Qty: 15 | Refills: 0 | OUTPATIENT
Start: 2018-07-03 | End: 2018-07-17

## 2018-07-03 RX ORDER — PROPRANOLOL HCL 160 MG
1 CAPSULE, EXTENDED RELEASE 24HR ORAL
Qty: 30 | Refills: 0 | OUTPATIENT
Start: 2018-07-03 | End: 2018-07-17

## 2018-07-03 RX ORDER — FOLIC ACID 0.8 MG
1 TABLET ORAL
Qty: 15 | Refills: 0 | OUTPATIENT
Start: 2018-07-03 | End: 2018-07-17

## 2018-07-03 RX ORDER — LEVOTHYROXINE SODIUM 125 MCG
1 TABLET ORAL
Qty: 0 | Refills: 0 | COMMUNITY

## 2018-07-03 RX ORDER — AMLODIPINE BESYLATE 2.5 MG/1
1 TABLET ORAL
Qty: 15 | Refills: 0 | OUTPATIENT
Start: 2018-07-03 | End: 2018-07-17

## 2018-07-03 RX ORDER — ATORVASTATIN CALCIUM 80 MG/1
1 TABLET, FILM COATED ORAL
Qty: 0 | Refills: 0 | COMMUNITY

## 2018-07-03 RX ORDER — OXCARBAZEPINE 300 MG/1
1 TABLET, FILM COATED ORAL
Qty: 30 | Refills: 0 | OUTPATIENT
Start: 2018-07-03 | End: 2018-07-17

## 2018-07-03 RX ORDER — ATORVASTATIN CALCIUM 80 MG/1
1 TABLET, FILM COATED ORAL
Qty: 15 | Refills: 0 | OUTPATIENT
Start: 2018-07-03 | End: 2018-07-17

## 2018-07-03 RX ORDER — LOSARTAN POTASSIUM 100 MG/1
1 TABLET, FILM COATED ORAL
Qty: 15 | Refills: 0 | OUTPATIENT
Start: 2018-07-03 | End: 2018-07-17

## 2018-07-03 RX ORDER — AMLODIPINE BESYLATE 2.5 MG/1
1 TABLET ORAL
Qty: 0 | Refills: 0 | COMMUNITY

## 2018-07-03 RX ORDER — LOSARTAN POTASSIUM 100 MG/1
1 TABLET, FILM COATED ORAL
Qty: 0 | Refills: 0 | COMMUNITY

## 2018-07-03 RX ADMIN — Medication 20 MILLIGRAM(S): at 09:50

## 2018-07-03 RX ADMIN — Medication 50 MICROGRAM(S): at 09:50

## 2018-07-03 RX ADMIN — Medication 1 MILLIGRAM(S): at 09:50

## 2018-07-03 RX ADMIN — AMLODIPINE BESYLATE 5 MILLIGRAM(S): 2.5 TABLET ORAL at 09:50

## 2018-07-03 RX ADMIN — LOSARTAN POTASSIUM 100 MILLIGRAM(S): 100 TABLET, FILM COATED ORAL at 09:50

## 2018-07-03 RX ADMIN — OXCARBAZEPINE 300 MILLIGRAM(S): 300 TABLET, FILM COATED ORAL at 09:50

## 2018-07-03 NOTE — PROGRESS NOTE BEHAVIORAL HEALTH - PROBLEM SELECTOR PLAN 5
- Januvia 100 mg po qd

## 2018-07-03 NOTE — PROGRESS NOTE BEHAVIORAL HEALTH - PROBLEM SELECTOR PLAN 4
ensure enlive 1 can tid

## 2018-07-03 NOTE — PROGRESS NOTE BEHAVIORAL HEALTH - RISK ASSESSMENT
Patient presents calm and cooperative with care. He denies any SI/HI. He has been unable to care for himself and requires further treatment
Patient continues to improve clinically and is currently not at an acutely elevated risk of harming self.

## 2018-07-03 NOTE — PROGRESS NOTE BEHAVIORAL HEALTH - NSBHADMITIPREASONDETAILS_PSY_A_CORE FT
unable to take care of self

## 2018-07-03 NOTE — PROGRESS NOTE BEHAVIORAL HEALTH - NSBHPTASSESSDT_PSY_A_CORE
02-Jul-2018 08:57
23-Jun-2018 11:37
27-Jun-2018 08:40
28-Jun-2018 08:48
03-Jul-2018 08:51
26-Jun-2018 08:49
29-Jun-2018 08:39

## 2018-07-03 NOTE — PROGRESS NOTE BEHAVIORAL HEALTH - NSBHADMITIPREASON_PSY_A_CORE
Danger to self; mental illness expected to respond to inpatient care
other reason

## 2018-07-03 NOTE — PROGRESS NOTE BEHAVIORAL HEALTH - NSBHATTESTSEENBY_PSY_A_CORE
Attending Psychiatrist supervising NP/Trainee, meeting pt...
Trainee with telephonic supervision from Attending Psychiatrist
Attending Psychiatrist supervising NP/Trainee, meeting pt...
Attending Psychiatrist supervising NP/Trainee, meeting pt...
attending Psychiatrist without NP/Trainee
Attending Psychiatrist supervising NP/Trainee, meeting pt...
Attending Psychiatrist supervising NP/Trainee, meeting pt...

## 2018-07-03 NOTE — PROGRESS NOTE BEHAVIORAL HEALTH - NS ED [facility] MSE GENERAL APPEARANCE
Well developed/Other
Other/Well developed
Other/Well developed
Well developed/Other
Well developed/Other
Well developed
Well developed/Other

## 2018-07-03 NOTE — PROGRESS NOTE BEHAVIORAL HEALTH - NSBHFUPINTERVALHXFT_PSY_A_CORE
No acute overnight events. Patient visible on the unit, ambulating with walker. Patient seen outside his room. On interview, patient repots that his mood is still "low" and states he has been "in a funk." The patient reports that his depressive symptoms have made it hard to focus on tasks and daily activities such as eating, and states he has been confused at times. Despite this, patient is oriented x 3, and knows the  is Idania. The denies HI/PI/AVH.
MSE sitting by the nursing station ;  ;   . makes  good eye contact; clean;  cognition is intact; oriented ; denies AVH or s/h i/i/p; speech normal volume, spontaneous, clear; mood sad; and somewhat anxious  affect sad but less  constricted;  no strangeness of language use.  i&j: fair to poor; appears to accept treatments and need to treat sxs.  while speech is slow; coherent ;  somewhat anxious about leaving but wants to leave.  no tremor or ridigity; walks slowly with a walker.  fair ADLs.
MSE updated from below sitting up in bed;  ;   . makes  good eye contact; clean;  cognition is intact; oriented ; denies AVH or s/h i/i/p; speech normal volume, spontaneous, clear; mood sad; and somewhat anxious  affect sad but less  constricted;  no strangeness of language use.  i&j: fair to poor; appears to accept treatments and need to treat sxs.  while speech is slow; coherent
Patient is pleasant on approach. Patient reports his mood is good and attributes this to getting good sleep last night. Patient reports that he has some concerns about transitioning from the hospital to home. He is worried that he will "crash and fall into a slump again and be sitting there for two weeks". He continues to express interest in having a home health aide or a nurse that would come check in on him, "similar to the type of care I get here". Patient attended two groups and stated that he was about to enjoy them. Patient reports good appetite and denies any suicidal or homicidal ideations.   Writer spoke to Dr Arroyo this am - current condition and PPH discussed, will speak tomorrow to discuss psychopharm and further management.
Pt in good behavioral control and participating in 1 select group per day. He continues to improve clinically.  His mood is now euthymic and he is free from SI/HI/AVH. Pt amenable to restarting his Trileptal although also reports having had so much energy on it that he eventually went into a manic state despite his prozac being down titrated at the same time.   Message left for his outpt psychiatrist Dr Arroyo again - no call back so far.
Pt seen this pm near  nursing station. He reported feeling dizzy after taking his nighttime meds and also this morning - this is most likely due to Trileptal dose increase.   He continues to remain in good behavioral control and is pleasant, calm, and coherent. His mood is good and he has a full range of affect. No signs of trini/agitation/psychosis.  No other concerns.  Awaiting a call from Dr Arroyo to discuss psychopharm today (writer left a message around 3 pm today).
Pt seen this am. He continues to remain in good behavioral control and is pleasant, calm, and coherent. He continues to report persistent insomnia. His mood is however euthymic and pt is free from trini/agitation/psychosis.  This morning, pt is concerned about constipation and urinary frequency - UA to be obtained.  No other concerns and pt has been tolerating his meds well.  Awaiting a call from Dr Arroyo to discuss psychopharm today.

## 2018-07-03 NOTE — PROGRESS NOTE BEHAVIORAL HEALTH - NSBHFUPINTERVALCCFT_PSY_A_CORE
"It is hard to concentrate"
Had diarrhea yesterday but better today; sleep appetite good; no pain issues; talks about making transition out of hosptial.    Dr. Arroyo  was contacted (consented)  [phone];  and  stated that SSIs failed and to consider SNRIs.
"I feel better"
wants to go home and  deal with finances etc.  anxious about leaving but feels ready to go; sleep appetite ok.
"I feel better"
"I felt dizzy after taking the medication"
"I go to the bathroom 10 times a day"

## 2018-07-03 NOTE — PROGRESS NOTE BEHAVIORAL HEALTH - SUMMARY
79 yo domiciled  M PPH bipolar d/o 1 self-aborted SA in 2012 BIB friend at behest of pt's PCP after pt reported having discontinued all of his meds and stopping eating. Reason for current decompensation remains unclear and is likely associated with recent medication changes vs acute infection (pt likely had bronchitis and could have decompensated 2/2 delirium). Pt's medical condition has been addressed and he has improved psychiatrically and is currently likely close to baseline. He continues to require an inpatient psychiatric hospitalization for further w/u, medication management and safe disposition planning given the unclear nature of his initial presentation.     ;;7/3: improved on current regime;  no physical  complaints today;  on Trileptal 300mg po bid for mood as well as Propranolol  20mg po q 12h for mood and bp.  Dr. Mesa contacted yesterday;  Consider adding an SNRI in view of  no evidence of trini and tolerance of mood stabilizer.  May need to raise Trileptal somewhat first.    However patient is stable sufficient for d/c today.

## 2018-07-03 NOTE — PROGRESS NOTE BEHAVIORAL HEALTH - NSBHCHARTREVIEWLAB_PSY_A_CORE FT
10.4   12.3  )-----------( 396      ( 26 Jun 2018 06:51 )             31.8   06-26    135  |  98  |  24<H>  ----------------------------<  161<H>  4.5   |  22  |  1.24    Ca    9.2      26 Jun 2018 06:51    TPro  7.7  /  Alb  3.3  /  TBili  0.3  /  DBili  x   /  AST  17  /  ALT  19  /  AlkPhos  57  06-26

## 2018-07-03 NOTE — PROGRESS NOTE BEHAVIORAL HEALTH - PROBLEM SELECTOR PLAN 2
- daily reorientation  -d/w patient's outpatient psychiatrist what is patient's baseline cognitive status
c/w home Synthroid 50 mg po qam  TSH nl

## 2018-07-03 NOTE — PROGRESS NOTE BEHAVIORAL HEALTH - NSBHADMITCOUNSEL_PSY_A_CORE
importance of adherence to chosen treatment/risk factor reduction/risks and benefits of treatment options/client/family/caregiver education/prognosis/instructions for management, treatment and follow up/diagnostic results/impressions and/or recommended studies

## 2018-07-03 NOTE — PROGRESS NOTE BEHAVIORAL HEALTH - PROBLEM SELECTOR PROBLEM 4
Disorder of nutrition

## 2018-07-03 NOTE — PROGRESS NOTE BEHAVIORAL HEALTH - NSBHCHARTREVIEWVS_PSY_A_CORE FT
Vital Signs Last 24 Hrs  T(C): 36.7 (01 Jul 2018 21:00), Max: 36.7 (01 Jul 2018 21:00)  T(F): 98 (01 Jul 2018 21:00), Max: 98 (01 Jul 2018 21:00)  HR: 65 (01 Jul 2018 21:00) (60 - 65)  BP: 150/71 (01 Jul 2018 21:00) (149/68 - 150/71)  BP(mean): --  RR: 17 (01 Jul 2018 21:00) (17 - 17)  SpO2: --
Vital Signs Last 24 Hrs  T(C): 37 (03 Jul 2018 09:03), Max: 37 (03 Jul 2018 09:03)  T(F): 98.6 (03 Jul 2018 09:03), Max: 98.6 (03 Jul 2018 09:03)  HR: 63 (03 Jul 2018 09:03) (61 - 63)  BP: 119/71 (03 Jul 2018 09:03) (119/71 - 122/63)  BP(mean): --  RR: 20 (03 Jul 2018 09:03) (18 - 20)  SpO2: --
Vital Signs Last 24 Hrs  T(C): 36.4 (27 Jun 2018 08:52), Max: 36.9 (26 Jun 2018 17:00)  T(F): 97.6 (27 Jun 2018 08:52), Max: 98.4 (26 Jun 2018 17:00)  HR: 61 (27 Jun 2018 08:52) (57 - 61)  BP: 100/58 (27 Jun 2018 08:52) (100/58 - 127/56)  BP(mean): --  RR: 16 (27 Jun 2018 06:24) (16 - 17)  SpO2: --
Vital Signs Last 24 Hrs  T(C): 36.7 (28 Jun 2018 08:55), Max: 37.4 (28 Jun 2018 06:19)  T(F): 98.1 (28 Jun 2018 08:55), Max: 99.3 (28 Jun 2018 06:19)  HR: 57 (28 Jun 2018 08:55) (57 - 62)  BP: 145/65 (28 Jun 2018 08:55) (115/65 - 145/65)  BP(mean): --  RR: 20 (28 Jun 2018 08:55) (18 - 20)  SpO2: --
Vital Signs Last 24 Hrs  T(C): 37 (29 Jun 2018 06:11), Max: 37 (28 Jun 2018 17:12)  T(F): 98.6 (29 Jun 2018 06:11), Max: 98.6 (28 Jun 2018 17:12)  HR: 68 (29 Jun 2018 06:11) (65 - 68)  BP: 133/64 (29 Jun 2018 06:11) (117/67 - 133/64)  BP(mean): --  RR: 18 (29 Jun 2018 06:11) (18 - 18)  SpO2: --
Vital Signs Last 24 Hrs  T(C): 36.6 (26 Jun 2018 09:38), Max: 37.1 (26 Jun 2018 06:25)  T(F): 97.9 (26 Jun 2018 09:38), Max: 98.7 (26 Jun 2018 06:25)  HR: 60 (26 Jun 2018 09:38) (60 - 67)  BP: 129/69 (26 Jun 2018 09:38) (108/53 - 129/69)  BP(mean): --  RR: 17 (26 Jun 2018 09:38) (16 - 20)  SpO2: --

## 2018-07-03 NOTE — PROGRESS NOTE BEHAVIORAL HEALTH - PROBLEM SELECTOR PLAN 1
- HOLDING Trileptal for now pending reconstitution  - Consider low dose Latuda / Seroquel for Bipolar depression
in the context of bipolar d/o  - Trileptal 600 mg po bid  - pt is to be started in antidepressant pending discussion w/outpt psychiatrist
in the context of bipolar d/o  - Trileptal 300 mg po bid (pt with dizziness upon dose increase)  - pt is to be started in antidepressant pending discussion w/outpt psychiatrist
in the context of bipolar d/o  - Trileptal 300 mg po bid (pt with dizziness upon dose increase)  - pt is to be started in antidepressant pending discussion w/outpt psychiatrist
in the context of bipolar d/o  - Trileptal 600 mg po bid
in the context of bipolar d/o  - Trileptal 300 mg po bid (pt with dizziness upon dose increase)  - pt is to be started in antidepressant pending discussion w/outpt psychiatrist
in a pt with self-reported bipolar d/o, likely bipolar 2 - collateral from pt's outpt psychiatrist pending.

## 2018-07-03 NOTE — PROGRESS NOTE BEHAVIORAL HEALTH - NSBHADMITIPOBSFT_PSY_A_CORE
Per unit protocol

## 2018-07-09 DIAGNOSIS — R19.7 DIARRHEA, UNSPECIFIED: ICD-10-CM

## 2018-07-09 DIAGNOSIS — J44.9 CHRONIC OBSTRUCTIVE PULMONARY DISEASE, UNSPECIFIED: ICD-10-CM

## 2018-07-09 DIAGNOSIS — I10 ESSENTIAL (PRIMARY) HYPERTENSION: ICD-10-CM

## 2018-07-09 DIAGNOSIS — Z91.19 PATIENT'S NONCOMPLIANCE WITH OTHER MEDICAL TREATMENT AND REGIMEN: ICD-10-CM

## 2018-07-09 DIAGNOSIS — R41.0 DISORIENTATION, UNSPECIFIED: ICD-10-CM

## 2018-07-09 DIAGNOSIS — E11.9 TYPE 2 DIABETES MELLITUS WITHOUT COMPLICATIONS: ICD-10-CM

## 2018-07-09 DIAGNOSIS — F31.30 BIPOLAR DISORDER, CURRENT EPISODE DEPRESSED, MILD OR MODERATE SEVERITY, UNSPECIFIED: ICD-10-CM

## 2018-07-09 DIAGNOSIS — E03.9 HYPOTHYROIDISM, UNSPECIFIED: ICD-10-CM

## 2018-07-09 DIAGNOSIS — Z91.5 PERSONAL HISTORY OF SELF-HARM: ICD-10-CM

## 2018-07-09 DIAGNOSIS — Z87.891 PERSONAL HISTORY OF NICOTINE DEPENDENCE: ICD-10-CM

## 2018-07-09 DIAGNOSIS — D64.9 ANEMIA, UNSPECIFIED: ICD-10-CM

## 2018-07-09 DIAGNOSIS — E78.5 HYPERLIPIDEMIA, UNSPECIFIED: ICD-10-CM

## 2018-07-09 DIAGNOSIS — E46 UNSPECIFIED PROTEIN-CALORIE MALNUTRITION: ICD-10-CM

## 2018-07-09 DIAGNOSIS — Z98.890 OTHER SPECIFIED POSTPROCEDURAL STATES: ICD-10-CM

## 2018-07-09 DIAGNOSIS — F31.9 BIPOLAR DISORDER, UNSPECIFIED: ICD-10-CM

## 2018-08-07 NOTE — ED BEHAVIORAL HEALTH ASSESSMENT NOTE - CURRENT MEDICATION
07-Aug-2018
Has not taken any medications in 2 weeks  PCP corroborates that patient has stopped his psychiatric medications  Of note, unclear exactly when psychiatric medications were changed. Patient told writer changes in medication were 6 months ago and that he was only supposed to be taking oxcarbazepine currently.

## 2018-10-08 PROCEDURE — 80061 LIPID PANEL: CPT

## 2018-10-08 PROCEDURE — 82607 VITAMIN B-12: CPT

## 2018-10-08 PROCEDURE — 80053 COMPREHEN METABOLIC PANEL: CPT

## 2018-10-08 PROCEDURE — 81001 URINALYSIS AUTO W/SCOPE: CPT

## 2018-10-08 PROCEDURE — 85027 COMPLETE CBC AUTOMATED: CPT

## 2018-10-08 PROCEDURE — 36415 COLL VENOUS BLD VENIPUNCTURE: CPT

## 2018-10-08 PROCEDURE — 82746 ASSAY OF FOLIC ACID SERUM: CPT

## 2018-10-08 PROCEDURE — 83036 HEMOGLOBIN GLYCOSYLATED A1C: CPT

## 2018-10-08 PROCEDURE — 85025 COMPLETE CBC W/AUTO DIFF WBC: CPT

## 2018-10-08 PROCEDURE — 86780 TREPONEMA PALLIDUM: CPT

## 2018-10-08 PROCEDURE — 82306 VITAMIN D 25 HYDROXY: CPT

## 2019-04-30 NOTE — ED ADULT NURSE NOTE - CAS EDN DISCHARGE INTERVENTIONS
Lives with significant other. 4 steps to enter with handrail. 12 inside.
Lives with significant other. 4 steps to enter with handrail. 12 inside.
Arm band on/IV intact

## 2020-05-21 NOTE — PROGRESS NOTE BEHAVIORAL HEALTH - PROBLEM SELECTOR PROBLEM 3
no fever and no chills.
Disorder of nutrition
Hypertension

## 2021-02-06 NOTE — PROGRESS NOTE BEHAVIORAL HEALTH - SUMMARY
(4) walks frequently 79 yo male with self-reported bipolar with apparently no psychiatric hospitalizations in past and multiple medical problems brought in by self accompanied by friend Lanette at the recommendation of his PCP after the patient stopped all his medications and stopped eating for the last 2 weeks. Patient reports ongoing depression symptoms and some hypomanic sx (hypersexuality) which contributed to his decision to stop his treatment. Patient's MMSE is 28. Patient's labs show improvement after IV hydration and restarting his home medications.

## 2021-06-09 NOTE — ED BEHAVIORAL HEALTH ASSESSMENT NOTE - BEHAVIOR
LM for patient that Dr gonzalez ordered a number of lab tests to be drawn and he should call to make an appointment to come in for a lab only appt.     Cooperative

## 2022-09-29 NOTE — PATIENT PROFILE ADULT. - SW.
"Requested Prescriptions   Pending Prescriptions Disp Refills    atorvastatin (LIPITOR) 10 MG tablet [Pharmacy Med Name: ATORVASTATIN 10MG TAB] 30 tablet 0     Sig: TAKE ONE TABLET BY MOUTH ONCE DAILY        Statins Protocol Failed - 9/27/2022  6:08 PM        Failed - LDL on file in past 12 months     Recent Labs   Lab Test 04/14/21  1103   *               Passed - No abnormal creatine kinase in past 12 months     No lab results found.             Passed - Recent (12 mo) or future (30 days) visit within the authorizing provider's specialty     Patient has had an office visit with the authorizing provider or a provider within the authorizing providers department within the previous 12 mos or has a future within next 30 days. See \"Patient Info\" tab in inbasket, or \"Choose Columns\" in Meds & Orders section of the refill encounter.              Passed - Medication is active on med list        Passed - Patient is age 18 or older        Passed - No active pregnancy on record        Passed - No positive pregnancy test in past 12 months                NEGRITO Kwong, RN          " social work

## 2023-07-09 NOTE — PATIENT PROFILE ADULT. - FUNCTIONAL SCREEN CURRENT LEVEL: TRANSFERRING, MLM
[facility] outreach    Question 1   Follow Up Completed   Have you completed a follow-up visit with your doctor? Press 1 if you have completed a follow-up, press 2 if you have not scheduled your appointment, press 3 if you missed your appointment, press 4 if you have an appointment scheduled for a follow-up   Appointment Not Scheduled (Issue Panel: [place] Clinical Intervention, Issue Alert: [place] Clinical Alert)    ### Required Interventions and Feedback     Call Status         Call Status:     Answered (edited by AKASH on 07/09/2023 03:12 PM CDT), Attempt 1 (edited by AKASH on 07/09/2023 03:11 PM CDT)     Follow Up Appointment - Issues         Follow Up Appointment - Issues List:     Other (Add Details in Comments) (edited by AKASH on 07/09/2023 03:12 PM CDT)    Comments:     Will be seeing her doctor on tues  (edited by AKASH on 07/09/2023 03:12 PM CDT)     Follow Up Appointment - Actions Taken         Follow Up Appointment - Actions List:     Provided Appt Information (edited by AKASH on 07/09/2023 03:12 PM CDT)  
(0) independent

## 2023-11-16 NOTE — PROGRESS NOTE BEHAVIORAL HEALTH - NS ED [facility] MED ROS NEUROLOGICAL
No complaints
no

## 2025-07-10 NOTE — PROGRESS NOTE BEHAVIORAL HEALTH - HYGIENE
----- Message from KELLEY Vela sent at 7/10/2025  9:48 AM CDT -----  Regarding: Refill med  Hey,     We share a mutual patient. He is out of his lisinopril and was asking for a refill. Please refill for him.     Thanks,     Nishi  
Fair